# Patient Record
Sex: FEMALE | Race: WHITE | NOT HISPANIC OR LATINO | Employment: PART TIME | URBAN - METROPOLITAN AREA
[De-identification: names, ages, dates, MRNs, and addresses within clinical notes are randomized per-mention and may not be internally consistent; named-entity substitution may affect disease eponyms.]

---

## 2017-05-17 ENCOUNTER — TRANSCRIBE ORDERS (OUTPATIENT)
Dept: ADMINISTRATIVE | Facility: HOSPITAL | Age: 42
End: 2017-05-17

## 2017-05-17 DIAGNOSIS — Z12.31 VISIT FOR SCREENING MAMMOGRAM: Primary | ICD-10-CM

## 2017-09-18 ENCOUNTER — APPOINTMENT (OUTPATIENT)
Dept: MAMMOGRAPHY | Facility: HOSPITAL | Age: 42
End: 2017-09-18
Attending: OBSTETRICS & GYNECOLOGY

## 2017-09-21 ENCOUNTER — APPOINTMENT (OUTPATIENT)
Dept: MAMMOGRAPHY | Facility: HOSPITAL | Age: 42
End: 2017-09-21
Attending: OBSTETRICS & GYNECOLOGY

## 2017-09-26 ENCOUNTER — HOSPITAL ENCOUNTER (OUTPATIENT)
Dept: MAMMOGRAPHY | Facility: HOSPITAL | Age: 42
Discharge: HOME | End: 2017-09-26
Attending: OBSTETRICS & GYNECOLOGY | Admitting: OBSTETRICS & GYNECOLOGY

## 2017-09-26 DIAGNOSIS — Z12.31 VISIT FOR SCREENING MAMMOGRAM: ICD-10-CM

## 2017-09-26 PROCEDURE — G0202 SCR MAMMO BI INCL CAD: HCPCS

## 2017-09-26 PROCEDURE — 77063 BREAST TOMOSYNTHESIS BI: CPT

## 2017-09-28 PROCEDURE — 77063 BREAST TOMOSYNTHESIS BI: CPT | Performed by: RADIOLOGY

## 2017-09-28 PROCEDURE — 77067 SCR MAMMO BI INCL CAD: CPT | Performed by: RADIOLOGY

## 2018-09-18 ENCOUNTER — TRANSCRIBE ORDERS (OUTPATIENT)
Dept: MAMMOGRAPHY | Facility: HOSPITAL | Age: 43
End: 2018-09-18

## 2018-09-18 DIAGNOSIS — Z12.31 VISIT FOR SCREENING MAMMOGRAM: Primary | ICD-10-CM

## 2018-10-01 ENCOUNTER — TRANSCRIBE ORDERS (OUTPATIENT)
Dept: ADMINISTRATIVE | Facility: HOSPITAL | Age: 43
End: 2018-10-01

## 2018-10-01 DIAGNOSIS — Z12.31 VISIT FOR SCREENING MAMMOGRAM: Primary | ICD-10-CM

## 2018-10-05 ENCOUNTER — APPOINTMENT (OUTPATIENT)
Dept: MAMMOGRAPHY | Facility: HOSPITAL | Age: 43
End: 2018-10-05
Attending: OBSTETRICS & GYNECOLOGY

## 2018-10-26 ENCOUNTER — OFFICE VISIT (OUTPATIENT)
Dept: NEUROLOGY | Facility: CLINIC | Age: 43
End: 2018-10-26

## 2018-10-26 VITALS
SYSTOLIC BLOOD PRESSURE: 130 MMHG | HEIGHT: 65 IN | BODY MASS INDEX: 35.82 KG/M2 | DIASTOLIC BLOOD PRESSURE: 82 MMHG | WEIGHT: 215 LBS

## 2018-10-26 DIAGNOSIS — G43.019 INTRACTABLE MIGRAINE WITHOUT AURA AND WITHOUT STATUS MIGRAINOSUS: Primary | ICD-10-CM

## 2018-10-26 PROCEDURE — 99204 OFFICE O/P NEW MOD 45 MIN: CPT | Performed by: PSYCHIATRY & NEUROLOGY

## 2018-10-26 RX ORDER — LIFITEGRAST 50 MG/ML
SOLUTION/ DROPS OPHTHALMIC
COMMUNITY
Start: 2018-10-19 | End: 2020-01-20

## 2018-10-26 RX ORDER — PANTOPRAZOLE SODIUM 40 MG/1
TABLET, DELAYED RELEASE ORAL
COMMUNITY
Start: 2018-09-18 | End: 2022-08-03

## 2018-10-26 RX ORDER — RIZATRIPTAN BENZOATE 10 MG/1
10 TABLET ORAL ONCE AS NEEDED
Qty: 8 TABLET | Refills: 1 | Status: SHIPPED | OUTPATIENT
Start: 2018-10-26 | End: 2018-11-07 | Stop reason: SDUPTHER

## 2018-10-26 RX ORDER — NAPROXEN 500 MG/1
500 TABLET ORAL 2 TIMES DAILY WITH MEALS
COMMUNITY
End: 2020-01-20

## 2018-10-26 RX ORDER — NORETHINDRONE 0.35 MG/1
TABLET ORAL
COMMUNITY
Start: 2018-09-15 | End: 2021-01-18 | Stop reason: SDUPTHER

## 2018-10-26 RX ORDER — SUMATRIPTAN 100 MG/1
TABLET, FILM COATED ORAL
COMMUNITY
Start: 2018-09-18 | End: 2018-10-26

## 2018-10-30 ENCOUNTER — PRIOR AUTHORIZATION (OUTPATIENT)
Dept: NEUROLOGY | Facility: CLINIC | Age: 43
End: 2018-10-30

## 2018-11-07 RX ORDER — RIZATRIPTAN BENZOATE 10 MG/1
10 TABLET ORAL ONCE AS NEEDED
Qty: 8 TABLET | Refills: 1 | Status: SHIPPED | OUTPATIENT
Start: 2018-11-07 | End: 2018-11-27 | Stop reason: SDUPTHER

## 2018-11-16 ENCOUNTER — HOSPITAL ENCOUNTER (OUTPATIENT)
Dept: MAMMOGRAPHY | Facility: HOSPITAL | Age: 43
Discharge: HOME | End: 2018-11-16
Attending: OBSTETRICS & GYNECOLOGY | Admitting: OBSTETRICS & GYNECOLOGY

## 2018-11-16 DIAGNOSIS — Z12.31 VISIT FOR SCREENING MAMMOGRAM: ICD-10-CM

## 2018-11-16 PROCEDURE — 77067 SCR MAMMO BI INCL CAD: CPT | Performed by: RADIOLOGY

## 2018-11-16 PROCEDURE — 77063 BREAST TOMOSYNTHESIS BI: CPT | Performed by: RADIOLOGY

## 2018-11-16 PROCEDURE — 77067 SCR MAMMO BI INCL CAD: CPT

## 2018-11-16 PROCEDURE — 77063 BREAST TOMOSYNTHESIS BI: CPT

## 2018-11-27 RX ORDER — RIZATRIPTAN BENZOATE 10 MG/1
10 TABLET ORAL ONCE AS NEEDED
Qty: 8 TABLET | Refills: 2 | Status: SHIPPED | OUTPATIENT
Start: 2018-11-27 | End: 2019-02-14 | Stop reason: SDUPTHER

## 2019-02-04 ENCOUNTER — OFFICE VISIT (OUTPATIENT)
Dept: NEUROLOGY | Facility: CLINIC | Age: 44
End: 2019-02-04

## 2019-02-04 VITALS
HEIGHT: 65 IN | WEIGHT: 221.8 LBS | BODY MASS INDEX: 36.96 KG/M2 | SYSTOLIC BLOOD PRESSURE: 132 MMHG | DIASTOLIC BLOOD PRESSURE: 76 MMHG

## 2019-02-04 DIAGNOSIS — G43.019 INTRACTABLE MIGRAINE WITHOUT AURA AND WITHOUT STATUS MIGRAINOSUS: Primary | ICD-10-CM

## 2019-02-04 PROCEDURE — 99213 OFFICE O/P EST LOW 20 MIN: CPT | Performed by: PSYCHIATRY & NEUROLOGY

## 2019-02-04 NOTE — PROGRESS NOTES
"Subjective:    CC: Moira Cardoso is in clinic today for follow up for  migraines.        HPI:  She is in clinic for regular follow-up.  Since the last visit, she reports that she has taken total of 3 Aimovig -140 mg subcutaneous injections.  She reports that it has worked really well and the headache frequency and intensity has significantly reduced.  In month of December, she had only 1 bad headache.  In January, she hasn't had headaches.  She is tolerating medication well without any side effects.  Prior to starting Aimovig, she was getting approximately 7 headaches in a month.    The following portions of the patient's history were reviewed and updated as of 2019: allergies, social history and problem list.       Current Outpatient Medications:   •  JOHNNIE 0.35 MG tablet, , Disp: , Rfl:   •  Erenumab-aooe (AIMOVIG) 70 MG/ML prefilled syringe, Inject 2 mL under the skin into the appropriate area as directed Every 30 (Thirty) Days., Disp: 2 mL, Rfl: 12  •  naproxen (NAPROSYN) 500 MG tablet, Take 500 mg by mouth 2 (Two) Times a Day With Meals., Disp: , Rfl:   •  pantoprazole (PROTONIX) 40 MG EC tablet, , Disp: , Rfl:   •  XIIDRA 5 % solution, , Disp: , Rfl:    Past Medical History:   Diagnosis Date   • Migraine       Past Surgical History:   Procedure Laterality Date   •  SECTION     • DENTAL PROCEDURE        Family History   Problem Relation Age of Onset   • Mental illness Mother    • Alzheimer's disease Maternal Grandmother    • Parkinsonism Maternal Grandfather    • Breast cancer Neg Hx    • Ovarian cancer Neg Hx         Review of Systems   All other systems reviewed and are negative.    Objective:    /76   Ht 165.1 cm (65\")   Wt 101 kg (221 lb 12.8 oz)   BMI 36.91 kg/m²     Neurology Exam:  General apperance: NAD.     Mental status: Alert, awake and oriented to time place and person.    Recent and Remote memory: Can recall 3/3 objects at 5 minutes. Can recall historical events. "     Attention span and Concentration: Serial 7s: Normal.     Fund of knowledge:  Normal.     Language and Speech: No aphasia or dysarthria.    Naming , Repitition and Comprehension:  Can name objects, repeat a sentence and follow commands. Speech is clear and fluent with good repetition, comprehension, and naming.    CN II to XII: Intact.    Opthalmoscopic Exam: No papilledema.    Motor:  Right UE muscle strength 5/5. Normal tone.     Left UE muscle strength 5/5. Normal tone.      Right LE muscle strength5/5. Normal tone.     Left LE muscle strength 5/5. Normal tone.      Sensory: Normal light touch, vibration and pinprick sensation bilaterally.    DTRs: 2+ bilaterally.    Babinski: Negative bilaterally.    Co-ordination: Normal finger-to-nose, heel to shin B/L.    Rhomberg: Negative.    Gait: Normal.    Cardiovascular: Regular rate and rhythm without murmur, gallop or rub.    Assessment and Plan:  1. Intractable migraine without aura and without status migrainosus  Doing very well with the Aimovig 140 mg subcutaneous monthly injections.  She hasn't had headaches in month of November and January.  In December, she had only 1 headache.  Headaches have significantly reduced.  I would like her to continue with Aimovig monthly injections.  She has failed Topamax and propranolol in the past.       I spent 15 minutes face to face with the patient and spent 10 minutes of this time counseling and discussing about taking medication regularly, possible side effects with medication use, importance of good sleep hygiene, good hydration and regular exercise.    Return in about 3 months (around 5/4/2019).

## 2019-02-14 RX ORDER — RIZATRIPTAN BENZOATE 10 MG/1
TABLET ORAL
Qty: 8 TABLET | Refills: 2 | Status: SHIPPED | OUTPATIENT
Start: 2019-02-14 | End: 2020-03-03

## 2019-02-21 ENCOUNTER — TELEPHONE (OUTPATIENT)
Dept: NEUROLOGY | Facility: CLINIC | Age: 44
End: 2019-02-21

## 2019-02-21 NOTE — TELEPHONE ENCOUNTER
Patient was taking Amovig injections. Insurance now advising to use Ajovy or Emaglity. Please advise.

## 2019-02-24 ENCOUNTER — TELEPHONE (OUTPATIENT)
Dept: URGENT CARE | Facility: CLINIC | Age: 44
End: 2019-02-24

## 2019-02-24 DIAGNOSIS — Z20.828 EXPOSURE TO INFLUENZA: Primary | ICD-10-CM

## 2019-02-24 RX ORDER — OSELTAMIVIR PHOSPHATE 75 MG/1
75 CAPSULE ORAL DAILY
Qty: 10 CAPSULE | Refills: 0 | Status: SHIPPED | OUTPATIENT
Start: 2019-02-24 | End: 2020-01-20

## 2019-02-25 ENCOUNTER — TELEPHONE (OUTPATIENT)
Dept: NEUROLOGY | Facility: CLINIC | Age: 44
End: 2019-02-25

## 2019-02-25 NOTE — TELEPHONE ENCOUNTER
It was not approved for a year. I believe the medical assistant before me is whom filed the PA and it was approved for a trail run, which is like 3-4 months.

## 2019-02-25 NOTE — TELEPHONE ENCOUNTER
Informed know that Insurance does not cover Aimovig. So Derek has sent another migraine treatment medication over to the pharmacy called [facility]. When patient is due for next injection, she can stop by our office to have it done or she can go to [employer] and watch the tutorial. Also, informed pt of co-pay card that can be utilized.

## 2019-02-27 ENCOUNTER — TELEPHONE (OUTPATIENT)
Dept: NEUROLOGY | Facility: CLINIC | Age: 44
End: 2019-02-27

## 2019-03-06 ENCOUNTER — TELEPHONE (OUTPATIENT)
Dept: NEUROLOGY | Facility: CLINIC | Age: 44
End: 2019-03-06

## 2019-03-06 NOTE — TELEPHONE ENCOUNTER
----- Message from Renu Valdez sent at 3/6/2019 12:46 PM EST -----  Contact: PT  KATE:    PT CALLED SAYING SHE IS DUE FOR HER AIMOVIG SHOT TODAY AND HER  WHO USUALLY DOES IT FOR HER IS NOT AVAILABLE, SHE WAS WONDERING IF SHE COULD STOP BY THE OFFICE FOR SOMEONE TO DO THAT FOR HER.    CALLBACK : [phone]

## 2019-04-30 ENCOUNTER — PRIOR AUTHORIZATION (OUTPATIENT)
Dept: NEUROLOGY | Facility: CLINIC | Age: 44
End: 2019-04-30

## 2019-04-30 ENCOUNTER — TELEPHONE (OUTPATIENT)
Dept: NEUROLOGY | Facility: CLINIC | Age: 44
End: 2019-04-30

## 2019-04-30 NOTE — TELEPHONE ENCOUNTER
I filed PA today. Do not think it will be approved because insurance advises using Ajovy or Emagilty.    ----- Message from Renu Valdez sent at 4/30/2019  9:45 AM EDT -----  Contact: pt  KATE:    PT HAS BEEN USING THE DISCOUNT CARD TO GET HER AIMOVIG BECAUSE HER INSURANCE WILL NOT COVER IT. BUT THE DISCOUNT CARD SAYS THEY WILL NOT COVER ANYMORE, SHES BEEN USING IT FOR 6 MONTHS, 2 INJECTIONS A MONTH. SHE JUST WANTS TO KNOW WHAT CAN BE DONE NOW BECAUSE THIS RX WORKS REALLY WELL FOR HER AND SHE DOESN'T REALLY WANT TO STOP OR TRY SOMETHING ELSE.    CALLBACK : [phone]

## 2019-05-01 ENCOUNTER — TELEPHONE (OUTPATIENT)
Dept: NEUROLOGY | Facility: CLINIC | Age: 44
End: 2019-05-01

## 2019-05-01 NOTE — TELEPHONE ENCOUNTER
Aimovig was denied by insurance. Patient has not had a trial and fail with Ajovy or Emagilty. Patient has exhausted the co-pay card and not willing to try another preventative migraine medication since she has done so well on it.

## 2019-05-06 ENCOUNTER — OFFICE VISIT (OUTPATIENT)
Dept: NEUROLOGY | Facility: CLINIC | Age: 44
End: 2019-05-06

## 2019-05-06 VITALS
BODY MASS INDEX: 37.1 KG/M2 | RESPIRATION RATE: 16 BRPM | HEIGHT: 65 IN | SYSTOLIC BLOOD PRESSURE: 128 MMHG | DIASTOLIC BLOOD PRESSURE: 78 MMHG | WEIGHT: 222.66 LBS | OXYGEN SATURATION: 98 % | HEART RATE: 110 BPM

## 2019-05-06 DIAGNOSIS — G43.019 INTRACTABLE MIGRAINE WITHOUT AURA AND WITHOUT STATUS MIGRAINOSUS: Primary | ICD-10-CM

## 2019-05-06 PROCEDURE — 99213 OFFICE O/P EST LOW 20 MIN: CPT | Performed by: PSYCHIATRY & NEUROLOGY

## 2019-05-06 NOTE — PROGRESS NOTES
Subjective:    CC: Moira Cardoso is in clinic today for follow up for migraines.    HPI:  She is in clinic for regular follow-up.  Since her last visit, she reports that  was her last Aimovig injection.  After that insurance denied Aimovig and now she is on Ajovy.  She is due for her first Ajovy injection today.  So far, Aimovig was helping significantly reduce headache intensity and frequency    The following portions of the patient's history were reviewed and updated as of 2019: allergies, social history and problem list.       Current Outpatient Medications:   •  JOHNNIE 0.35 MG tablet, , Disp: , Rfl:   •  Fremanezumab-vfrm 225 MG/1.5ML solution prefilled syringe, Inject 225 mg under the skin into the appropriate area as directed Every 30 (Thirty) Days., Disp: 1 syringe, Rfl: 11  •  naproxen (NAPROSYN) 500 MG tablet, Take 500 mg by mouth 2 (Two) Times a Day With Meals., Disp: , Rfl:   •  pantoprazole (PROTONIX) 40 MG EC tablet, , Disp: , Rfl:   •  rizatriptan (MAXALT) 10 MG tablet, TAKE 1 TABLET ONE TIME AS  NEEDED FOR MIGRAINE, MAY   REPEAT IN 2 HOURS IF NEEDEDFOR UP TO 30 DAYS, Disp: 8 tablet, Rfl: 2  •  XIIDRA 5 % solution, , Disp: , Rfl:   •  oseltamivir (TAMIFLU) 75 MG capsule, Take 1 capsule by mouth Daily., Disp: 10 capsule, Rfl: 0   Past Medical History:   Diagnosis Date   • Migraine       Past Surgical History:   Procedure Laterality Date   •  SECTION     • DENTAL PROCEDURE        Family History   Problem Relation Age of Onset   • Mental illness Mother    • Alzheimer's disease Maternal Grandmother    • Parkinsonism Maternal Grandfather    • Breast cancer Neg Hx    • Ovarian cancer Neg Hx         Review of Systems   Constitutional: Negative.    HENT: Negative.    Eyes: Negative.    Respiratory: Negative.    Cardiovascular: Negative.    Gastrointestinal: Negative.    Endocrine: Negative.    Genitourinary: Negative.    Musculoskeletal: Negative.    Skin: Negative.   "  Allergic/Immunologic: Negative.    Neurological: Negative.    Hematological: Negative.    Psychiatric/Behavioral: Negative.      Objective:    /78   Pulse 110   Resp 16   Ht 165.1 cm (65\")   Wt 101 kg (222 lb 10.6 oz)   SpO2 98%   BMI 37.05 kg/m²     Neurology Exam:  General apperance: NAD.     Mental status: Alert, awake and oriented to time place and person.    Recent and Remote memory: Can recall 3/3 objects at 5 minutes. Can recall historical events.     Attention span and Concentration: Serial 7s: Normal.     Fund of knowledge:  Normal.     Language and Speech: No aphasia or dysarthria.    Naming , Repitition and Comprehension:  Can name objects, repeat a sentence and follow commands. Speech is clear and fluent with good repetition, comprehension, and naming.    CN II to XII: Intact.    Opthalmoscopic Exam: No papilledema.    Motor:  Right UE muscle strength 5/5. Normal tone.     Left UE muscle strength 5/5. Normal tone.      Right LE muscle strength5/5. Normal tone.     Left LE muscle strength 5/5. Normal tone.      Sensory: Normal light touch, vibration and pinprick sensation bilaterally.    DTRs: 2+ bilaterally.    Babinski: Negative bilaterally.    Co-ordination: Normal finger-to-nose, heel to shin B/L.    Rhomberg: Negative.    Gait: Normal.    Cardiovascular: Regular rate and rhythm without murmur, gallop or rub.    Assessment and Plan:  1. Intractable migraine without aura and without status migrainosus  She did Aimovig injections for 1 year and her last injection was on April 6.  Following this, insurance has denied Aimovig.  Today first Ajovy injection was done in clinic.  Since she did very well with Aimovig, I am expecting same results with Ajovy.  Her next Ajovy injection will be on June 6.  I have advised her to call office if headaches become more frequent or intense otherwise, continue with Ajovy monthly injections and I will see her back in 3 months for follow-up.       I spent 15 " minutes face to face with the patient and spent 10 minutes of this time  in management, instructions and education regarding above mentioned diagnosis and also on counseling and discussing about taking medication regularly, possible side effects with medication use, importance of good sleep hygiene, good hydration and regular exercise.    Return in about 3 months (around 8/6/2019).

## 2019-05-22 ENCOUNTER — TELEPHONE (OUTPATIENT)
Dept: NEUROLOGY | Facility: CLINIC | Age: 44
End: 2019-05-22

## 2019-05-22 NOTE — TELEPHONE ENCOUNTER
----- Message from Zeina Armas, [employer] Rep sent at 5/22/2019 11:18 AM EDT -----  Derek    PT called, stated her insurance was giving her a bit of trouble again. Says they're requesting another prior auth for her Ajovy injections. PT is still using the same pharmacy, (at [facility] PT would like a prior authorization to be completed for the medication and she'd like a notification once it's been completed.     Please call Moira back:  [phone]

## 2019-05-22 NOTE — TELEPHONE ENCOUNTER
Spoke with pharmacy help line and got pt Ajovy approved for 3 months. They will be faxing over a approval letter. Left voicemail informing pt of information.

## 2019-09-04 ENCOUNTER — PRIOR AUTHORIZATION (OUTPATIENT)
Dept: NEUROLOGY | Facility: CLINIC | Age: 44
End: 2019-09-04

## 2019-09-04 ENCOUNTER — TELEPHONE (OUTPATIENT)
Dept: NEUROLOGY | Facility: CLINIC | Age: 44
End: 2019-09-04

## 2019-09-04 NOTE — TELEPHONE ENCOUNTER
----- Message from Karis Gaytan sent at 9/4/2019 10:32 AM EDT -----  Contact: Pt   Derek     Pt called in regards to prior autho for RX Fremanezumab-vfrm 225 MG/1.5ML . Pt wants to know if its been completed yet. Please call.

## 2019-09-19 ENCOUNTER — OFFICE VISIT (OUTPATIENT)
Dept: NEUROLOGY | Facility: CLINIC | Age: 44
End: 2019-09-19

## 2019-09-19 VITALS
HEIGHT: 65 IN | DIASTOLIC BLOOD PRESSURE: 78 MMHG | SYSTOLIC BLOOD PRESSURE: 124 MMHG | BODY MASS INDEX: 38.49 KG/M2 | WEIGHT: 231 LBS

## 2019-09-19 DIAGNOSIS — G43.019 INTRACTABLE MIGRAINE WITHOUT AURA AND WITHOUT STATUS MIGRAINOSUS: Primary | ICD-10-CM

## 2019-09-19 PROCEDURE — 99214 OFFICE O/P EST MOD 30 MIN: CPT | Performed by: PSYCHIATRY & NEUROLOGY

## 2019-09-19 NOTE — PROGRESS NOTES
Subjective:    CC: Moira Cardoso is in clinic today for follow up for migraines.    HPI:  She is in clinic for regular follow-up.  Since her last visit, she has been taking Ajovy every month.  She is now done about 5 Ajovy injections.  She reports that the effect with Ajovy is as not as good as what it was with Aimovig.  With Aimovig, she had complete resolution of headaches but with Ajovy, she is getting 3-4 breakthrough headaches in a month.  Still better than more than 15 headaches that she was experiencing prior to starting CGRP blockers.  She takes rizatriptan or ibuprofen as needed as an abortive therapy and it works really well.  She denies any side effects with Ajovy use.  She used Aimovig for 6 months prior to Ajovy and then insurance denied it.    The following portions of the patient's history were reviewed and updated as of 2019: allergies, social history and problem list.       Current Outpatient Medications:   •  JOHNNIE 0.35 MG tablet, , Disp: , Rfl:   •  Fremanezumab-vfrm 225 MG/1.5ML solution prefilled syringe, Inject 225 mg under the skin into the appropriate area as directed Every 30 (Thirty) Days., Disp: 1 syringe, Rfl: 11  •  naproxen (NAPROSYN) 500 MG tablet, Take 500 mg by mouth 2 (Two) Times a Day With Meals., Disp: , Rfl:   •  oseltamivir (TAMIFLU) 75 MG capsule, Take 1 capsule by mouth Daily., Disp: 10 capsule, Rfl: 0  •  pantoprazole (PROTONIX) 40 MG EC tablet, , Disp: , Rfl:   •  rizatriptan (MAXALT) 10 MG tablet, TAKE 1 TABLET ONE TIME AS  NEEDED FOR MIGRAINE, MAY   REPEAT IN 2 HOURS IF NEEDEDFOR UP TO 30 DAYS, Disp: 8 tablet, Rfl: 2  •  XIIDRA 5 % solution, , Disp: , Rfl:    Past Medical History:   Diagnosis Date   • Migraine       Past Surgical History:   Procedure Laterality Date   •  SECTION     • DENTAL PROCEDURE        Family History   Problem Relation Age of Onset   • Mental illness Mother    • Alzheimer's disease Maternal Grandmother    • Parkinsonism Maternal  "Grandfather    • Breast cancer Neg Hx    • Ovarian cancer Neg Hx         Review of Systems   Neurological: Positive for headache.     Objective:    /78   Ht 165.1 cm (65\")   Wt 105 kg (231 lb)   BMI 38.44 kg/m²     Neurology Exam:  General apperance: NAD.     Mental status: Alert, awake and oriented to time place and person.    Recent and Remote memory: Can recall 3/3 objects at 5 minutes. Can recall historical events.     Attention span and Concentration: Serial 7s: Normal.     Fund of knowledge:  Normal.     Language and Speech: No aphasia or dysarthria.    Naming , Repitition and Comprehension:  Can name objects, repeat a sentence and follow commands. Speech is clear and fluent with good repetition, comprehension, and naming.    CN II to XII: Intact.    Opthalmoscopic Exam: No papilledema.    Motor:  Right UE muscle strength 5/5. Normal tone.     Left UE muscle strength 5/5. Normal tone.      Right LE muscle strength5/5. Normal tone.     Left LE muscle strength 5/5. Normal tone.      Sensory: Normal light touch, vibration and pinprick sensation bilaterally.    DTRs: 2+ bilaterally.    Babinski: Negative bilaterally.    Co-ordination: Normal finger-to-nose, heel to shin B/L.    Rhomberg: Negative.    Gait: Normal.    Cardiovascular: Regular rate and rhythm without murmur, gallop or rub.    Assessment and Plan:  1. Intractable migraine without aura and without status migrainosus  She has now been on Ajovy for last 5 months.  Headache intensity and frequency has reduced and currently she is expensing 3-4 breakthrough migraines.  She overall did much better on Aimovig but still happy with Ajovy as it does help reduce headache intensity and frequency significantly.  Continue with Ajovy for now.  Continue with Maxalt as needed as an abortive therapy.  I will see her back in 6 months for follow-up.       I spent 15 minutes face to face with the patient and spent more than 50% of this time  in management, " instructions and education regarding above mentioned diagnosis and also on counseling and discussing about taking medication regularly, possible side effects with medication use, importance of good sleep hygiene, good hydration and regular exercise.    Return in about 6 months (around 3/19/2020).

## 2019-10-21 ENCOUNTER — TRANSCRIBE ORDERS (OUTPATIENT)
Dept: ADMINISTRATIVE | Facility: HOSPITAL | Age: 44
End: 2019-10-21

## 2019-10-21 DIAGNOSIS — Z12.31 VISIT FOR SCREENING MAMMOGRAM: Primary | ICD-10-CM

## 2020-01-17 ENCOUNTER — HOSPITAL ENCOUNTER (OUTPATIENT)
Dept: MAMMOGRAPHY | Facility: HOSPITAL | Age: 45
Discharge: HOME | End: 2020-01-17
Admitting: OBSTETRICS & GYNECOLOGY

## 2020-01-17 DIAGNOSIS — Z12.31 VISIT FOR SCREENING MAMMOGRAM: ICD-10-CM

## 2020-01-17 PROCEDURE — 77063 BREAST TOMOSYNTHESIS BI: CPT | Performed by: RADIOLOGY

## 2020-01-17 PROCEDURE — 77067 SCR MAMMO BI INCL CAD: CPT

## 2020-01-17 PROCEDURE — 77063 BREAST TOMOSYNTHESIS BI: CPT

## 2020-01-17 PROCEDURE — 77067 SCR MAMMO BI INCL CAD: CPT | Performed by: RADIOLOGY

## 2020-01-20 ENCOUNTER — OFFICE VISIT (OUTPATIENT)
Dept: NEUROSURGERY | Facility: CLINIC | Age: 45
End: 2020-01-20

## 2020-01-20 VITALS
WEIGHT: 230 LBS | DIASTOLIC BLOOD PRESSURE: 82 MMHG | SYSTOLIC BLOOD PRESSURE: 120 MMHG | HEIGHT: 65 IN | BODY MASS INDEX: 38.32 KG/M2

## 2020-01-20 DIAGNOSIS — M54.50 CHRONIC BILATERAL LOW BACK PAIN WITHOUT SCIATICA: Primary | ICD-10-CM

## 2020-01-20 DIAGNOSIS — G89.29 CHRONIC BILATERAL LOW BACK PAIN WITHOUT SCIATICA: Primary | ICD-10-CM

## 2020-01-20 PROBLEM — E66.812 CLASS 2 OBESITY DUE TO EXCESS CALORIES WITHOUT SERIOUS COMORBIDITY WITH BODY MASS INDEX (BMI) OF 38.0 TO 38.9 IN ADULT: Status: ACTIVE | Noted: 2020-01-20

## 2020-01-20 PROBLEM — E66.09 CLASS 2 OBESITY DUE TO EXCESS CALORIES WITHOUT SERIOUS COMORBIDITY WITH BODY MASS INDEX (BMI) OF 38.0 TO 38.9 IN ADULT: Status: ACTIVE | Noted: 2020-01-20

## 2020-01-20 PROCEDURE — 99203 OFFICE O/P NEW LOW 30 MIN: CPT | Performed by: NEUROLOGICAL SURGERY

## 2020-01-20 NOTE — PROGRESS NOTES
Subjective     Chief Complaint: Low back pain    Patient ID: Moira Cardoso is a 44 y.o. female seen for consultation today at the request of  Brenda Holm MD    Back Pain   This is a chronic problem. The current episode started more than 1 month ago. The problem occurs intermittently. The problem has been waxing and waning since onset. The pain is present in the lumbar spine. The quality of the pain is described as aching. The pain does not radiate. The pain is moderate. The pain is worse during the day. The symptoms are aggravated by bending and sitting. Stiffness is present in the morning. Associated symptoms include headaches. Pertinent negatives include no abdominal pain, chest pain, dysuria, fever, numbness, pelvic pain or weakness. Risk factors include lack of exercise and obesity. She has tried analgesics, heat and NSAIDs for the symptoms. The treatment provided moderate relief.       This is a 44-year-old woman who presents to my office with an approximately 1 year history of left-sided low back pain.  She denies any sciatica type symptoms.  She does have plantar fasciitis on her right foot and wonders if the fact that she has been limping on this foot for quite some time has contributed to the worsening of her low back pain.  She has a primarily sedentary desk job and she reports that sitting for long periods of time exacerbates her low back pain.  She recently went to [employer] and reports that walking around seemed to help her back pain quite a bit.  She also reports that bending over to take care of children also exacerbates her back pain.  She denies any bladder/bowel incontinence.  She is overweight, but does not have any significant comorbid conditions other than some migraines.  She denies alcohol or tobacco abuse.    The following portions of the patient's history were reviewed and updated as appropriate: allergies, current medications, past family history, past medical history, past social  history, past surgical history and problem list.    Family history:   Family History   Problem Relation Age of Onset   • Mental illness Mother    • Alzheimer's disease Maternal Grandmother    • Parkinsonism Maternal Grandfather    • Breast cancer Neg Hx    • Ovarian cancer Neg Hx        Social history:   Social History     Socioeconomic History   • Marital status:      Spouse name: Not on file   • Number of children: Not on file   • Years of education: Not on file   • Highest education level: Not on file   Tobacco Use   • Smoking status: Never Smoker   • Smokeless tobacco: Never Used   Substance and Sexual Activity   • Alcohol use: Yes     Comment: occasionally   • Drug use: No   • Sexual activity: Defer   Social History Narrative           Review of Systems   Constitutional: Negative for activity change, appetite change, chills, diaphoresis, fatigue, fever and unexpected weight change.   HENT: Negative for congestion, dental problem, drooling, ear discharge, ear pain, facial swelling, hearing loss, mouth sores, nosebleeds, postnasal drip, rhinorrhea, sinus pressure, sinus pain, sneezing, sore throat, tinnitus, trouble swallowing and voice change.    Eyes: Negative for photophobia, pain, discharge, redness, itching and visual disturbance.   Respiratory: Negative for apnea, cough, choking, chest tightness, shortness of breath, wheezing and stridor.    Cardiovascular: Negative for chest pain, palpitations and leg swelling.   Gastrointestinal: Negative for abdominal distention, abdominal pain, anal bleeding, blood in stool, constipation, diarrhea, nausea, rectal pain and vomiting.   Endocrine: Negative for cold intolerance, heat intolerance, polydipsia, polyphagia and polyuria.   Genitourinary: Negative for decreased urine volume, difficulty urinating, dyspareunia, dysuria, enuresis, flank pain, frequency, genital sores, hematuria, menstrual problem, pelvic pain, urgency, vaginal bleeding, vaginal  "discharge and vaginal pain.   Musculoskeletal: Positive for back pain. Negative for arthralgias, gait problem, joint swelling, myalgias, neck pain and neck stiffness.   Skin: Negative for color change, pallor, rash and wound.   Allergic/Immunologic: Negative for environmental allergies, food allergies and immunocompromised state.   Neurological: Positive for headaches. Negative for dizziness, tremors, seizures, syncope, facial asymmetry, speech difficulty, weakness, light-headedness and numbness.   Hematological: Negative for adenopathy. Does not bruise/bleed easily.   Psychiatric/Behavioral: Negative for agitation, behavioral problems, confusion, decreased concentration, dysphoric mood, hallucinations, self-injury, sleep disturbance and suicidal ideas. The patient is not nervous/anxious and is not hyperactive.        Objective   Blood pressure 120/82, height 165.1 cm (65\"), weight 104 kg (230 lb), last menstrual period 01/03/2020.  Body mass index is 38.27 kg/m².    Physical Exam   Constitutional: She is oriented to person, place, and time. She appears well-developed.  Non-toxic appearance.   HENT:   Head: Normocephalic and atraumatic.   Right Ear: Hearing normal.   Left Ear: Hearing normal.   Nose: Nose normal.   Eyes: Pupils are equal, round, and reactive to light. Conjunctivae, EOM and lids are normal.   Neck: Normal range of motion. No JVD present.   Cardiovascular: Normal rate and regular rhythm.   Pulses:       Radial pulses are 2+ on the right side, and 2+ on the left side.   Pulmonary/Chest: Effort normal. No stridor. No respiratory distress. She has no wheezes.   Neurological: She is alert and oriented to person, place, and time. She has normal reflexes. She displays normal reflexes. No cranial nerve deficit. She exhibits normal muscle tone. She displays a negative Romberg sign. Coordination and gait normal. GCS eye subscore is 4. GCS verbal subscore is 5. GCS motor subscore is 6.   Reflex Scores:       " Patellar reflexes are 2+ on the right side and 2+ on the left side.       Achilles reflexes are 2+ on the right side and 2+ on the left side.  Skin: Skin is warm and dry. No rash noted. No erythema.   Psychiatric: She has a normal mood and affect. Her behavior is normal. Judgment and thought content normal.   Nursing note and vitals reviewed.        Assessment/Plan     Independent Review of Radiographic Studies:      Available for my review is a MRI of the lumbar spine which was performed on 12/16/2019.  This demonstrates an annular tear at L5-S1 with some early Modic endplate changes at this level.  There is additionally some degenerative disc disease with a broad-based disc bulge at L1-2.  The spectral spinal canal is capacious.  There are no abnormal foci of significant lateral recess or neuroforaminal stenosis.  The small disc bulge at L5-S1 does not significantly encroach upon the lateral recesses with the descending S1 nerve roots.  Lumbar lordosis is somewhat decreased.    Medical Decision Making:      This is a 44-year-old woman with chronic, intermittent left-sided low back pain without sciatica.  She is an excellent candidate for physical therapy.  I reviewed the signs and symptoms of lumbosacral radiculopathy and cauda equina with her.  I directed her to contact my office with new or worsening symptoms.  Otherwise, I would be happy to follow-up with her on an as-needed basis moving forward.    Moira was seen today for back pain.    Diagnoses and all orders for this visit:    Chronic bilateral low back pain without sciatica  -     Ambulatory Referral to Physical Therapy        Return if symptoms worsen or fail to improve.           This document signed by RAUDEL Simmons MD January 20, 2020 2:03 PM

## 2020-03-03 RX ORDER — RIZATRIPTAN BENZOATE 10 MG/1
TABLET ORAL
Qty: 8 TABLET | Refills: 2 | Status: SHIPPED | OUTPATIENT
Start: 2020-03-03 | End: 2021-01-21 | Stop reason: SDUPTHER

## 2020-09-09 ENCOUNTER — OFFICE VISIT (OUTPATIENT)
Dept: NEUROLOGY | Facility: CLINIC | Age: 45
End: 2020-09-09

## 2020-09-09 ENCOUNTER — TELEPHONE (OUTPATIENT)
Dept: NEUROLOGY | Facility: CLINIC | Age: 45
End: 2020-09-09

## 2020-09-09 VITALS
OXYGEN SATURATION: 99 % | WEIGHT: 235.8 LBS | BODY MASS INDEX: 39.29 KG/M2 | DIASTOLIC BLOOD PRESSURE: 70 MMHG | SYSTOLIC BLOOD PRESSURE: 112 MMHG | HEIGHT: 65 IN | TEMPERATURE: 98.2 F | HEART RATE: 91 BPM

## 2020-09-09 DIAGNOSIS — G43.019 INTRACTABLE MIGRAINE WITHOUT AURA AND WITHOUT STATUS MIGRAINOSUS: Primary | ICD-10-CM

## 2020-09-09 PROCEDURE — 99214 OFFICE O/P EST MOD 30 MIN: CPT | Performed by: PSYCHIATRY & NEUROLOGY

## 2020-09-09 NOTE — TELEPHONE ENCOUNTER
FABIÁN FROM [facility] CALLED RE: PT'S LOADING DOSE OF EMGALITY, STATED QUANTITY DOES NOT MATCH NORMAL FOR INITIAL LOADING DOSE, AS IT IS TYPICALLY DOUBLE THE MAINTENANCE DOSE.  PLEASE CALL TO CLARIFY IF THIS IS THE CORRECT QUANTITY.    PLEASE CALL BACK: [phone]

## 2020-09-09 NOTE — PROGRESS NOTES
Subjective:    CC: Moira Cardoso is in clinic today for follow up for intractable migraines.    HPI:    9/19/2019: She is in clinic for regular follow-up.  Since her last visit, she has been taking Ajovy every month.  She is now done about 5 Ajovy injections.  She reports that the effect with Ajovy is as not as good as what it was with Aimovig.  With Aimovig, she had complete resolution of headaches but with Ajovy, she is getting 3-4 breakthrough headaches in a month.  Still better than more than 15 headaches that she was experiencing prior to starting CGRP blockers.  She takes rizatriptan or ibuprofen as needed as an abortive therapy and it works really well.  She denies any side effects with Ajovy use.  She used Aimovig for 6 months prior to Ajovy and then insurance denied it.    9/9/2020: She is in clinic for regular follow-up.  Since her last visit 1 year ago, she reports that in the last 3 to 4 months, migraine intensity and frequency is slightly worse.  She is now experiencing 5-6 breakthrough migraines increase from 2-3 breakthrough migraines.  She is on Ajovy monthly injections and has done it for 1 year now.  Prior to Ajovy, she was on Aimovig and it had helped her significantly in controlling migraines but unfortunately insurance denied it.  She currently takes Maxalt as needed as an abortive treatment and does report side effects with Maxalt use.    The following portions of the patient's history were reviewed and updated as of 09/09/2020: allergies, social history and problem list.       Current Outpatient Medications:   •  JOHNNIE 0.35 MG tablet, , Disp: , Rfl:   •  pantoprazole (PROTONIX) 40 MG EC tablet, , Disp: , Rfl:   •  galcanezumab-gnlm (EMGALITY) 120 MG/ML prefilled syringe, Inject 2 mL under the skin into the appropriate area as directed 1 (One) Time for 1 dose., Disp: 1.12 mL, Rfl: 0  •  galcanezumab-gnlm (EMGALITY) 120 MG/ML prefilled syringe, Inject 1 mL under the skin into the appropriate  "area as directed Every 30 (Thirty) Days., Disp: 1.12 mL, Rfl: 11  •  rizatriptan (MAXALT) 10 MG tablet, TAKE 1 TABLET ONE TIME AS  NEEDED FOR MIGRAINE, MAY   REPEAT IN 2 HOURS IF NEEDEDFOR UP TO 30 DAYS, Disp: 8 tablet, Rfl: 2  •  silver sulfadiazine (SILVADENE, SSD) 1 % cream, Apply  topically to the appropriate area as directed 2 (Two) Times a Day., Disp: 50 g, Rfl: 0  •  ubrogepant (ubrogepant) 100 MG tablet, Take 1 tablet by mouth As Needed (Migraine) for up to 30 days., Disp: 10 tablet, Rfl: 3   Past Medical History:   Diagnosis Date   • Migraine       Past Surgical History:   Procedure Laterality Date   •  SECTION     • DENTAL PROCEDURE        Family History   Problem Relation Age of Onset   • Mental illness Mother    • Alzheimer's disease Maternal Grandmother    • Parkinsonism Maternal Grandfather    • Breast cancer Neg Hx    • Ovarian cancer Neg Hx         Review of Systems   Constitutional: Negative.    HENT: Negative.    Eyes: Negative.    Respiratory: Negative.    Cardiovascular: Negative.    Gastrointestinal: Negative.    Endocrine: Negative.    Genitourinary: Negative.    Musculoskeletal: Negative.    Skin: Negative.    Allergic/Immunologic: Negative.    Neurological: Positive for light-headedness and headache.   Hematological: Negative.    Psychiatric/Behavioral: Negative.      Objective:    /70   Pulse 91   Temp 98.2 °F (36.8 °C)   Ht 165.1 cm (65\")   Wt 107 kg (235 lb 12.8 oz)   SpO2 99%   BMI 39.24 kg/m²     Neurology Exam:  General apperance: NAD.     Mental status: Alert, awake and oriented to time place and person.    Recent and Remote memory: Can recall 3/3 objects at 5 minutes. Can recall historical events.     Attention span and Concentration: Serial 7s: Normal.     Fund of knowledge:  Normal.     Language and Speech: No aphasia or dysarthria.    Naming , Repitition and Comprehension:  Can name objects, repeat a sentence and follow commands. Speech is clear and fluent with " good repetition, comprehension, and naming.    CN II to XII: Intact.    Opthalmoscopic Exam: No papilledema.    Motor:  Right UE muscle strength 5/5. Normal tone.     Left UE muscle strength 5/5. Normal tone.      Right LE muscle strength5/5. Normal tone.     Left LE muscle strength 5/5. Normal tone.      Sensory: Normal light touch, vibration and pinprick sensation bilaterally.    DTRs: 2+ bilaterally.    Babinski: Negative bilaterally.    Co-ordination: Normal finger-to-nose, heel to shin B/L.    Rhomberg: Negative.    Gait: Normal.    Cardiovascular: Regular rate and rhythm without murmur, gallop or rub.    Assessment and Plan:  1. Intractable migraine without aura and without status migrainosus  Since she is reporting worsening in migraine intensity and frequency with Ajovy use, will stop Ajovy and instead start Emgality monthly injection for migraine prevention.  She is also reporting side effects with Maxalt use so will prescribe her Ubrelvy 100 mg to be taken as needed as an abortive treatment and I will plan to see her back in 8 to 10 weeks for follow-up.       I spent 25 minutes face to face with the patient and spent more than 50% of this time  in management, instructions and education regarding above mentioned diagnosis and also on counseling and discussing about taking medication regularly, possible side effects with medication use, importance of good sleep hygiene, good hydration and regular exercise.    Return in about 10 weeks (around 11/18/2020).

## 2020-09-09 NOTE — TELEPHONE ENCOUNTER
S/W Lanre in pharmacy in [facility]. Advised to double the initial dosage of Emgality.     Called pt to inform her of medication schedule. She verbalized understanding

## 2020-09-24 ENCOUNTER — TELEPHONE (OUTPATIENT)
Dept: NEUROLOGY | Facility: CLINIC | Age: 45
End: 2020-09-24

## 2020-10-23 ENCOUNTER — TELEPHONE (OUTPATIENT)
Dept: NEUROLOGY | Facility: CLINIC | Age: 45
End: 2020-10-23

## 2020-10-26 NOTE — TELEPHONE ENCOUNTER
PT CALLED IN STATING SHE NEEDS A REFILL ON THE EMGALITY. UNDER HER MED LIST, REFILLS SHOWS 11 BUT CHARLETTEMITCHEL STATES SHE HAS NO REFILLS LEFT. PT STATES SHE NEEDS MEDICATION BY Thursday AS SHE TAKES THE SHOT THE SAME DAY EVERY MONTH.      CALL BACK- [phone]

## 2020-10-27 RX ORDER — GALCANEZUMAB 120 MG/ML
INJECTION, SOLUTION SUBCUTANEOUS
Qty: 2 EACH | Refills: 11 | Status: SHIPPED | OUTPATIENT
Start: 2020-10-27 | End: 2021-01-13

## 2020-11-30 ENCOUNTER — TELEPHONE (OUTPATIENT)
Dept: NEUROLOGY | Facility: CLINIC | Age: 45
End: 2020-11-30

## 2020-12-14 ENCOUNTER — TRANSCRIBE ORDERS (OUTPATIENT)
Dept: ADMINISTRATIVE | Facility: HOSPITAL | Age: 45
End: 2020-12-14

## 2020-12-14 DIAGNOSIS — Z12.31 VISIT FOR SCREENING MAMMOGRAM: Primary | ICD-10-CM

## 2021-01-13 ENCOUNTER — OFFICE VISIT (OUTPATIENT)
Dept: NEUROLOGY | Facility: CLINIC | Age: 46
End: 2021-01-13

## 2021-01-13 VITALS
TEMPERATURE: 97.5 F | DIASTOLIC BLOOD PRESSURE: 84 MMHG | BODY MASS INDEX: 36.99 KG/M2 | WEIGHT: 222 LBS | OXYGEN SATURATION: 99 % | SYSTOLIC BLOOD PRESSURE: 110 MMHG | HEIGHT: 65 IN | HEART RATE: 95 BPM

## 2021-01-13 DIAGNOSIS — G43.019 INTRACTABLE MIGRAINE WITHOUT AURA AND WITHOUT STATUS MIGRAINOSUS: Primary | ICD-10-CM

## 2021-01-13 PROCEDURE — 99214 OFFICE O/P EST MOD 30 MIN: CPT | Performed by: PSYCHIATRY & NEUROLOGY

## 2021-01-13 RX ORDER — ERENUMAB-AOOE 140 MG/ML
140 INJECTION, SOLUTION SUBCUTANEOUS
Qty: 1.12 ML | Refills: 11 | Status: SHIPPED | OUTPATIENT
Start: 2021-01-13 | End: 2021-11-09 | Stop reason: SDUPTHER

## 2021-01-13 NOTE — PROGRESS NOTES
Subjective:    CC: Moira Cardoso is in clinic today for follow up for      HPI:  9/19/2019: She is in clinic for regular follow-up.  Since her last visit, she has been taking Ajovy every month.  She is now done about 5 Ajovy injections.  She reports that the effect with Ajovy is as not as good as what it was with Aimovig.  With Aimovig, she had complete resolution of headaches but with Ajovy, she is getting 3-4 breakthrough headaches in a month.  Still better than more than 15 headaches that she was experiencing prior to starting CGRP blockers.  She takes rizatriptan or ibuprofen as needed as an abortive therapy and it works really well.  She denies any side effects with Ajovy use.  She used Aimovig for 6 months prior to Ajovy and then insurance denied it.    9/9/2020: She is in clinic for regular follow-up.  Since her last visit 1 year ago, she reports that in the last 3 to 4 months, migraine intensity and frequency is slightly worse.  She is now experiencing 5-6 breakthrough migraines increase from 2-3 breakthrough migraines.  She is on Ajovy monthly injections and has done it for 1 year now.  Prior to Ajovy, she was on Aimovig and it had helped her significantly in controlling migraines but unfortunately insurance denied it.  She currently takes Maxalt as needed as an abortive treatment and does report side effects with Maxalt use.    1/13/2021: She is in clinic for regular follow-up.  Since her last visit in September, insurance did not approve Emgality.  She did try Ajovy prior to trying Emgality for about 6 months but it did not really help.  She had an excellent response with Aimovig monthly injection.  In the past, she has tried and failed Topamax and propranolol.      The following portions of the patient's history were reviewed and updated as of 01/13/2021: allergies, social history and problem list.       Current Outpatient Medications:   •  JOHNNIE 0.35 MG tablet, , Disp: , Rfl:   •  pantoprazole  "(PROTONIX) 40 MG EC tablet, , Disp: , Rfl:   •  rizatriptan (MAXALT) 10 MG tablet, TAKE 1 TABLET ONE TIME AS  NEEDED FOR MIGRAINE, MAY   REPEAT IN 2 HOURS IF NEEDEDFOR UP TO 30 DAYS, Disp: 8 tablet, Rfl: 2  •  Erenumab-aooe (Aimovig) 140 MG/ML prefilled syringe, Inject 1 mL under the skin into the appropriate area as directed Every 30 (Thirty) Days., Disp: 1.12 mL, Rfl: 11   Past Medical History:   Diagnosis Date   • Migraine       Past Surgical History:   Procedure Laterality Date   •  SECTION     • DENTAL PROCEDURE        Family History   Problem Relation Age of Onset   • Mental illness Mother    • Alzheimer's disease Maternal Grandmother    • Parkinsonism Maternal Grandfather    • Breast cancer Neg Hx    • Ovarian cancer Neg Hx         Review of Systems   Constitutional: Negative.    HENT: Negative.    Eyes: Negative.    Respiratory: Negative.    Cardiovascular: Negative.    Gastrointestinal: Negative.    Endocrine: Negative.    Genitourinary: Negative.    Musculoskeletal: Negative.    Skin: Negative.    Allergic/Immunologic: Negative.    Neurological: Positive for headache.   Hematological: Negative.    Psychiatric/Behavioral: Negative.      Objective:    /84   Pulse 95   Temp 97.5 °F (36.4 °C)   Ht 165.1 cm (65\")   Wt 101 kg (222 lb)   SpO2 99%   BMI 36.94 kg/m²     Neurology Exam:  General apperance: NAD.     Mental status: Alert, awake and oriented to time place and person.    Recent and Remote memory: Can recall 3/3 objects at 5 minutes. Can recall historical events.     Attention span and Concentration: Serial 7s: Normal.     Fund of knowledge:  Normal.     Language and Speech: No aphasia or dysarthria.    Naming , Repitition and Comprehension:  Can name objects, repeat a sentence and follow commands. Speech is clear and fluent with good repetition, comprehension, and naming.    CN II to XII: Intact.    Opthalmoscopic Exam: No papilledema.    Motor:  Right UE muscle strength 5/5. Normal " tone.     Left UE muscle strength 5/5. Normal tone.      Right LE muscle strength5/5. Normal tone.     Left LE muscle strength 5/5. Normal tone.      Sensory: Normal light touch, vibration and pinprick sensation bilaterally.    DTRs: 2+ bilaterally.    Babinski: Negative bilaterally.    Co-ordination: Normal finger-to-nose, heel to shin B/L.    Rhomberg: Negative.    Gait: Normal.    Cardiovascular: Regular rate and rhythm without murmur, gallop or rub.    Assessment and Plan:  1. Intractable migraine without aura and without status migrainosus  So far she has tried all 3 CGRP blockers but Aimovig has been the most effective.  With Aimovig, her migraine resolved almost completely.  Last time she tried Aimovig was back in year 2018.  I am going to prescribe her Aimovig again and hopefully insurance will approve it.  Maxalt 10 mg as needed is working well as an abortive treatment.  I will plan to see her back in clinic in 3 months for follow-up.       I spent 25 minutes face to face with the patient and spent more than 50% of this time  in management, instructions and education regarding above mentioned diagnosis and also on counseling and discussing about taking medication regularly, possible side effects with medication use, importance of good sleep hygiene, good hydration and regular exercise.    No follow-ups on file.

## 2021-01-19 RX ORDER — NORETHINDRONE 0.35 MG/1
1 TABLET ORAL DAILY
Qty: 90 TABLET | Refills: 1 | Status: SHIPPED | OUTPATIENT
Start: 2021-01-19 | End: 2021-01-25 | Stop reason: SDUPTHER

## 2021-01-21 DIAGNOSIS — G43.019 INTRACTABLE MIGRAINE WITHOUT AURA AND WITHOUT STATUS MIGRAINOSUS: Primary | ICD-10-CM

## 2021-01-21 NOTE — TELEPHONE ENCOUNTER
Spoke to patient and informed her waiting for approval or denial on Aimovig and a refill request for Maxalt has been sent to Dr. Reed.

## 2021-01-21 NOTE — TELEPHONE ENCOUNTER
ELIM in regards to patient's Aimovig approval and stated once pharmacy received the PA confirmation they will get that filled for her.

## 2021-01-21 NOTE — TELEPHONE ENCOUNTER
Provider: DR LOVE    Caller: CA PEREZ     Relationship to Patient: SELF    Phone Number: [phone]  Reason for Call:  PT IS CALLING IN REGARDING PRE AUTH ON Erenumab-aooe (Aimovig) 140 MG/ML prefilled syringe   STATES WHEN SHE CALLED THE PHARMACY SHE WAS TOLD THAT THE PHARMACY HAS BEEN TRYING TO GET PRE AUTH FROM THIS OFFICE ..  PT IS ALSO REQUESTING A REFILL ON rizatriptan (MAXALT) 10 MG tablet PT HAS THREE PILLS OF THIS MEDICATION  LEFT      PLEASE ADVISE

## 2021-01-22 RX ORDER — RIZATRIPTAN BENZOATE 10 MG/1
10 TABLET ORAL ONCE AS NEEDED
Qty: 10 TABLET | Refills: 6 | OUTPATIENT
Start: 2021-01-22 | End: 2021-10-25

## 2021-01-25 RX ORDER — NORETHINDRONE 0.35 MG/1
1 TABLET ORAL DAILY
Qty: 90 TABLET | Refills: 1 | Status: SHIPPED | OUTPATIENT
Start: 2021-01-25 | End: 2021-07-13 | Stop reason: SDUPTHER

## 2021-01-25 NOTE — TELEPHONE ENCOUNTER
Annual 7/8//2020 with Denies - she refilled for the year but now she needs [facility] mail order

## 2021-03-05 ENCOUNTER — APPOINTMENT (OUTPATIENT)
Dept: MAMMOGRAPHY | Facility: HOSPITAL | Age: 46
End: 2021-03-05

## 2021-04-19 ENCOUNTER — HOSPITAL ENCOUNTER (OUTPATIENT)
Dept: MAMMOGRAPHY | Facility: HOSPITAL | Age: 46
Discharge: HOME | End: 2021-04-19
Admitting: OBSTETRICS & GYNECOLOGY

## 2021-04-19 DIAGNOSIS — Z12.31 VISIT FOR SCREENING MAMMOGRAM: ICD-10-CM

## 2021-04-19 PROCEDURE — 77063 BREAST TOMOSYNTHESIS BI: CPT | Performed by: RADIOLOGY

## 2021-04-19 PROCEDURE — 77063 BREAST TOMOSYNTHESIS BI: CPT

## 2021-04-19 PROCEDURE — 77067 SCR MAMMO BI INCL CAD: CPT

## 2021-04-19 PROCEDURE — 77067 SCR MAMMO BI INCL CAD: CPT | Performed by: RADIOLOGY

## 2021-05-13 RX ORDER — SODIUM, POTASSIUM,MAG SULFATES 17.5-3.13G
2 SOLUTION, RECONSTITUTED, ORAL ORAL TAKE AS DIRECTED
Qty: 354 ML | Refills: 0 | Status: SHIPPED | OUTPATIENT
Start: 2021-05-13 | End: 2021-08-31

## 2021-05-30 ENCOUNTER — APPOINTMENT (OUTPATIENT)
Dept: PREADMISSION TESTING | Facility: HOSPITAL | Age: 46
End: 2021-05-30

## 2021-07-13 ENCOUNTER — OFFICE VISIT (OUTPATIENT)
Dept: OBSTETRICS AND GYNECOLOGY | Facility: CLINIC | Age: 46
End: 2021-07-13

## 2021-07-13 VITALS
HEIGHT: 65 IN | WEIGHT: 234 LBS | DIASTOLIC BLOOD PRESSURE: 82 MMHG | SYSTOLIC BLOOD PRESSURE: 124 MMHG | BODY MASS INDEX: 38.99 KG/M2

## 2021-07-13 DIAGNOSIS — N95.1 MENOPAUSAL SYMPTOMS: ICD-10-CM

## 2021-07-13 DIAGNOSIS — Z01.419 WOMEN'S ANNUAL ROUTINE GYNECOLOGICAL EXAMINATION: Primary | ICD-10-CM

## 2021-07-13 DIAGNOSIS — R14.0 BLOATING: ICD-10-CM

## 2021-07-13 DIAGNOSIS — Z12.39 ENCOUNTER FOR BREAST CANCER SCREENING USING NON-MAMMOGRAM MODALITY: ICD-10-CM

## 2021-07-13 DIAGNOSIS — Z30.41 ENCOUNTER FOR SURVEILLANCE OF CONTRACEPTIVE PILLS: ICD-10-CM

## 2021-07-13 PROCEDURE — 99396 PREV VISIT EST AGE 40-64: CPT | Performed by: OBSTETRICS & GYNECOLOGY

## 2021-07-13 RX ORDER — SODIUM PICOSULFATE, MAGNESIUM OXIDE, AND ANHYDROUS CITRIC ACID 10; 3.5; 12 MG/160ML; G/160ML; G/160ML
LIQUID ORAL
COMMUNITY
Start: 2021-05-13 | End: 2021-08-31

## 2021-07-13 RX ORDER — NORETHINDRONE 0.35 MG/1
1 TABLET ORAL DAILY
Qty: 90 TABLET | Refills: 1 | Status: SHIPPED | OUTPATIENT
Start: 2021-07-13 | End: 2021-07-13 | Stop reason: SDUPTHER

## 2021-07-13 RX ORDER — CYANOCOBALAMIN 1000 UG/ML
INJECTION, SOLUTION INTRAMUSCULAR; SUBCUTANEOUS
COMMUNITY
Start: 2021-04-29 | End: 2021-10-25

## 2021-07-13 RX ORDER — NORETHINDRONE 0.35 MG/1
1 TABLET ORAL DAILY
Qty: 90 TABLET | Refills: 3 | Status: SHIPPED | OUTPATIENT
Start: 2021-07-13 | End: 2021-07-20

## 2021-07-13 NOTE — PROGRESS NOTES
GYN Annual Exam     CC - Here for annual exam. She has not had a hysterectomy and does have one or both ovaries.    Subjective   HPI  Moira Cardoso is a 45 y.o. female, , who presents for annual well woman exam.  She is perimenopausal. Her last LMP was Patient's last menstrual period was 2021..  Periods are absent, lasting 5 days.  Dysmenorrhea:moderate, occurring throughout cycle.  Patient reports problems with: pelvic pain  x2 months that comes and goes. She also reports daily bloating, night sweats since 2021 and itching skin for 2-3 weeks.   Partner Status: Marital Status: .  New Partners since last visit: no Desires STD Screening: no    Last mammogram:  2021- negative  Last Completed Mammogram          MAMMOGRAM (Yearly) Next due on 2021  Mammo Screening Digital Tomosynthesis Bilateral With CAD    2020  Mammo Screening Digital Tomosynthesis Bilateral With CAD    2018  Mammo Screening Digital Tomosynthesis Bilateral With CAD    2017  Mammo Screening Digital Tomosynthesis Bilateral With CAD    2016  Mammo screening digital tomosynthesis bilateral w cad    Only the first 5 history entries have been loaded, but more history exists.                Last colonoscopy :  Pending            Additional OB/GYN History     Current contraception: POP  Desires to: continue contraception  Last Pap : 2020- negative HPV non 16/18 negative  Last Completed Pap Smear          PAP SMEAR (Every 3 Years) Next due on 2020  Done - negative- HPV non 16/18 negative              History of abnormal Pap smear: yes -   Family history of uterine, colon, breast, or ovarian cancer: no  Performs monthly Self-Breast Exam: no  Parental Hip Fracture: no  Exercises Regularly: no  Feelings of Anxiety or Depression: no    Tobacco Usage?: No   OB History        1    Para   1    Term   1            AB        Living           SAB     "    TAB        Ectopic        Molar        Multiple        Live Births                    Health Maintenance   Topic Date Due   • Annual Gynecologic Pelvic and Breast Exam  Never done   • COLORECTAL CANCER SCREENING  Never done   • ANNUAL PHYSICAL  Never done   • TDAP/TD VACCINES (1 - Tdap) Never done   • HEPATITIS C SCREENING  Never done   • INFLUENZA VACCINE  08/01/2021   • MAMMOGRAM  04/19/2022   • PAP SMEAR  07/08/2023   • COVID-19 Vaccine  Completed   • Pneumococcal Vaccine 0-64  Aged Out       The additional following portions of the patient's history were reviewed and updated as appropriate: allergies, current medications, past family history, past medical history, past social history, past surgical history and problem list.    Review of Systems   Constitutional: Negative.    HENT: Negative.    Eyes: Negative.    Respiratory: Negative.    Cardiovascular: Negative.    Gastrointestinal: Negative.    Endocrine: Negative.    Genitourinary: Positive for pelvic pain.   Musculoskeletal: Negative.    Skin: Negative.    Allergic/Immunologic: Negative.    Neurological: Negative.    Hematological: Negative.    Psychiatric/Behavioral: Negative.        I have reviewed and agree with the HPI, ROS, and historical information as entered above. Gisel Bonilla MD    Objective   /82   Ht 165.1 cm (65\")   Wt 106 kg (234 lb)   LMP 01/06/2021   BMI 38.94 kg/m²     Physical Exam  Vitals and nursing note reviewed. Exam conducted with a chaperone present.   Constitutional:       Appearance: She is well-developed.   HENT:      Head: Normocephalic and atraumatic.   Neck:      Thyroid: No thyroid mass or thyromegaly.   Cardiovascular:      Rate and Rhythm: Normal rate and regular rhythm.      Heart sounds: No murmur heard.     Pulmonary:      Effort: Pulmonary effort is normal. No retractions.      Breath sounds: Normal breath sounds. No wheezing, rhonchi or rales.   Chest:      Chest wall: No mass or tenderness.      " Breasts:         Right: Normal. No mass, nipple discharge, skin change or tenderness.         Left: Normal. No mass, nipple discharge, skin change or tenderness.   Abdominal:      General: Bowel sounds are normal.      Palpations: Abdomen is soft. Abdomen is not rigid. There is no mass.      Tenderness: There is no abdominal tenderness. There is no guarding.      Hernia: No hernia is present. There is no hernia in the left inguinal area.   Genitourinary:     Labia:         Right: No rash, tenderness or lesion.         Left: No rash, tenderness or lesion.       Vagina: Normal. No vaginal discharge or lesions.      Cervix: No cervical motion tenderness, discharge, lesion or cervical bleeding.      Uterus: Normal. Not enlarged, not fixed and not tender.       Adnexa:         Right: No mass or tenderness.          Left: No mass or tenderness.        Rectum: No external hemorrhoid.   Musculoskeletal:      Cervical back: Normal range of motion. No muscular tenderness.   Neurological:      Mental Status: She is alert and oriented to person, place, and time.   Psychiatric:         Behavior: Behavior normal.         Assessment/Plan     Encounter Diagnoses   Name Primary?   • Women's annual routine gynecological examination Yes   • Encounter for breast cancer screening using non-mammogram modality    • Bloating    • Vaginal itching    • Encounter for surveillance of contraceptive pills    • Menopausal symptoms          Recommended use of Vitamin D replacement and getting adequate calcium in her diet. (1500mg)  Reviewed monthly self breast exams.  Instructed to call with lumps, pain, or breast discharge.    Continue yearly mammography  Reviewed HPV guidelines.  Reviewed exercise as a preventative health measures.   Perimenopausal sx - reviewed and she feels she can tolerate for right now.  Offered to return for u/s but she would like to wait and see how things go.  POP- counseled we can stop these at any time to see if she  resumes cycles and or do blood work.        Gisel Bonilla MD   07/13/2021

## 2021-07-20 RX ORDER — NORETHINDRONE 0.35 MG/1
TABLET ORAL
Qty: 84 TABLET | Refills: 3 | Status: SHIPPED | OUTPATIENT
Start: 2021-07-20 | End: 2022-08-03

## 2021-08-31 RX ORDER — SODIUM, POTASSIUM,MAG SULFATES 17.5-3.13G
2 SOLUTION, RECONSTITUTED, ORAL ORAL TAKE AS DIRECTED
Qty: 354 ML | Refills: 0 | OUTPATIENT
Start: 2021-08-31 | End: 2021-10-25

## 2021-09-15 ENCOUNTER — OUTSIDE FACILITY SERVICE (OUTPATIENT)
Dept: GASTROENTEROLOGY | Facility: CLINIC | Age: 46
End: 2021-09-15

## 2021-09-15 PROCEDURE — 45378 DIAGNOSTIC COLONOSCOPY: CPT | Performed by: INTERNAL MEDICINE

## 2021-11-09 ENCOUNTER — OFFICE VISIT (OUTPATIENT)
Dept: NEUROLOGY | Facility: CLINIC | Age: 46
End: 2021-11-09

## 2021-11-09 ENCOUNTER — TELEPHONE (OUTPATIENT)
Dept: NEUROLOGY | Facility: CLINIC | Age: 46
End: 2021-11-09

## 2021-11-09 VITALS — OXYGEN SATURATION: 97 % | HEIGHT: 65 IN | HEART RATE: 94 BPM | WEIGHT: 225 LBS | BODY MASS INDEX: 37.49 KG/M2

## 2021-11-09 DIAGNOSIS — G43.019 INTRACTABLE MIGRAINE WITHOUT AURA AND WITHOUT STATUS MIGRAINOSUS: Primary | ICD-10-CM

## 2021-11-09 PROCEDURE — 99214 OFFICE O/P EST MOD 30 MIN: CPT | Performed by: PSYCHIATRY & NEUROLOGY

## 2021-11-09 RX ORDER — RIZATRIPTAN BENZOATE 10 MG/1
10 TABLET ORAL ONCE AS NEEDED
Qty: 10 TABLET | Refills: 6 | Status: SHIPPED | OUTPATIENT
Start: 2021-11-09 | End: 2023-03-27

## 2021-11-09 RX ORDER — ERENUMAB-AOOE 140 MG/ML
140 INJECTION, SOLUTION SUBCUTANEOUS
Qty: 1.12 ML | Refills: 11 | Status: SHIPPED | OUTPATIENT
Start: 2021-11-09 | End: 2022-03-10

## 2021-11-09 NOTE — PROGRESS NOTES
Subjective:    CC: Moira Cardoso is in clinic today for follow up for migraines.    HPI:  9/19/2019: She is in clinic for regular follow-up.  Since her last visit, she has been taking Ajovy every month.  She is now done about 5 Ajovy injections.  She reports that the effect with Ajovy is as not as good as what it was with Aimovig.  With Aimovig, she had complete resolution of headaches but with Ajovy, she is getting 3-4 breakthrough headaches in a month.  Still better than more than 15 headaches that she was experiencing prior to starting CGRP blockers.  She takes rizatriptan or ibuprofen as needed as an abortive therapy and it works really well.  She denies any side effects with Ajovy use.  She used Aimovig for 6 months prior to Ajovy and then insurance denied it.    9/9/2020: She is in clinic for regular follow-up.  Since her last visit 1 year ago, she reports that in the last 3 to 4 months, migraine intensity and frequency is slightly worse.  She is now experiencing 5-6 breakthrough migraines increase from 2-3 breakthrough migraines.  She is on Ajovy monthly injections and has done it for 1 year now.  Prior to Ajovy, she was on Aimovig and it had helped her significantly in controlling migraines but unfortunately insurance denied it.  She currently takes Maxalt as needed as an abortive treatment and does report side effects with Maxalt use.    1/13/2021: She is in clinic for regular follow-up.  Since her last visit in September, insurance did not approve Emgality.  She did try Ajovy prior to trying Emgality for about 6 months but it did not really help.  She had an excellent response with Aimovig monthly injection.  In the past, she has tried and failed Topamax and propranolol.    11/9/2021: She is in clinic for regular follow-up.  Since her last visit in January 2021, Aimovig is now approved by insurance and she has been taking it every month.  She reports that she has had excellent response with Aimovig and  "has had almost complete resolution of migraines.  Whenever she gets breakthrough migraine, she will use rizatriptan at the onset and it does work as an abortive treatment.      The following portions of the patient's history were reviewed and updated as of 2021: allergies, social history and problem list.       Current Outpatient Medications:   •  Erenumab-aooe (Aimovig) 140 MG/ML prefilled syringe, Inject 1 mL under the skin into the appropriate area as directed Every 30 (Thirty) Days., Disp: 1.12 mL, Rfl: 11  •  Ilda 0.35 MG tablet, TAKE 1 TABLET DAILY, Disp: 84 tablet, Rfl: 3  •  imiquimod (ALDARA) 5 % cream, , Disp: , Rfl:   •  pantoprazole (PROTONIX) 40 MG EC tablet, , Disp: , Rfl:   •  rizatriptan (MAXALT) 10 MG tablet, Take 1 tablet by mouth 1 (One) Time As Needed for Migraine for up to 30 doses. May repeat in 2 hours if needed, Disp: 10 tablet, Rfl: 6   Past Medical History:   Diagnosis Date   • Migraine       Past Surgical History:   Procedure Laterality Date   •  SECTION     • DENTAL PROCEDURE        Family History   Problem Relation Age of Onset   • Mental illness Mother    • Alzheimer's disease Maternal Grandmother    • Parkinsonism Paternal Grandfather    • Breast cancer Neg Hx    • Ovarian cancer Neg Hx         Review of Systems   Constitutional: Negative.    HENT: Negative.    Eyes: Negative.    Respiratory: Negative.    Cardiovascular: Negative.    Gastrointestinal: Negative.    Endocrine: Negative.    Genitourinary: Negative.    Musculoskeletal: Negative.    Skin: Negative.    Allergic/Immunologic: Negative.    Hematological: Negative.    Psychiatric/Behavioral: Negative.      Objective:    Pulse 94   Ht 165.1 cm (65\")   Wt 102 kg (225 lb)   SpO2 97%   BMI 37.44 kg/m²     Neurology Exam:  General apperance: NAD.     Mental status: Alert, awake and oriented to time place and person.    Recent and Remote memory: Can recall 3/3 objects at 5 minutes. Can recall historical events. "     Attention span and Concentration: Serial 7s: Normal.     Fund of knowledge:  Normal.     Language and Speech: No aphasia or dysarthria.    Naming , Repitition and Comprehension:  Can name objects, repeat a sentence and follow commands. Speech is clear and fluent with good repetition, comprehension, and naming.    CN II to XII: Intact.    Opthalmoscopic Exam: No papilledema.    Motor:  Right UE muscle strength 5/5. Normal tone.     Left UE muscle strength 5/5. Normal tone.      Right LE muscle strength5/5. Normal tone.     Left LE muscle strength 5/5. Normal tone.      Sensory: Normal light touch, vibration and pinprick sensation bilaterally.    DTRs: 2+ bilaterally.    Babinski: Negative bilaterally.    Co-ordination: Normal finger-to-nose, heel to shin B/L.    Rhomberg: Negative.    Gait: Normal.    Cardiovascular: Regular rate and rhythm without murmur, gallop or rub.    Assessment and Plan:  1. Intractable migraine without aura and without status migrainosus  With insurance approving Aimovig and reinitiation of Aimovig, she has had excellent response with almost complete resolution of migraines.  In the past, she has tried Ajovy and Emgality without success.  Continue with Aimovig with injection with rizatriptan as needed as an abortive treatment and I will see her back in clinic in 6 months for follow-up.  - rizatriptan (MAXALT) 10 MG tablet; Take 1 tablet by mouth 1 (One) Time As Needed for Migraine for up to 30 doses. May repeat in 2 hours if needed  Dispense: 10 tablet; Refill: 6       I spent 30 minutes face to face with the patient and spent more than 50% of this time  in management, instructions and education regarding above mentioned diagnosis and also on counseling and discussing about taking medication regularly, possible side effects with medication use, importance of good sleep hygiene, good hydration and regular exercise.    Return in about 6 months (around 5/9/2022).

## 2022-01-25 ENCOUNTER — TELEPHONE (OUTPATIENT)
Dept: NEUROLOGY | Facility: CLINIC | Age: 47
End: 2022-01-25

## 2022-01-25 NOTE — TELEPHONE ENCOUNTER
Caller: [place]  Relationship: SELF  Best call back number: [phone]    What medications are you currently taking:   Current Outpatient Medications on File Prior to Visit   Medication Sig Dispense Refill   • Erenumab-aooe (Aimovig) 140 MG/ML prefilled syringe Inject 1 mL under the skin into the appropriate area as directed Every 30 (Thirty) Days. 1.12 mL 11   • Ilda 0.35 MG tablet TAKE 1 TABLET DAILY 84 tablet 3   • imiquimod (ALDARA) 5 % cream      • pantoprazole (PROTONIX) 40 MG EC tablet      • rizatriptan (MAXALT) 10 MG tablet Take 1 tablet by mouth 1 (One) Time As Needed for Migraine for up to 30 doses. May repeat in 2 hours if needed 10 tablet 6     No current facility-administered medications on file prior to visit.        Which medication are you concerned about: AIMOVIG    Who prescribed you this medication:     What are your concerns: PT RECEIVED A LETTER POST DATED 1-21-22 FROM INSURANCE STATING HER AIMOVIG WAS DENIED D/T NOT HAVING FAILED MEDICATIONS PRIOR. PT STATES SHE THOUGHT ALL THAT WAS HANDLED LAST YEAR, PLEASE REVIEW AND ADVISE    PT'S NEXT DUE DATE FOR INJECTION IS 1-29-22     How long have you had these concerns: 1-21-22

## 2022-01-26 NOTE — TELEPHONE ENCOUNTER
Informed patient that I have not heard back from insurance but she is more than welcome to stop by the office and  one If we have not received a determination before Saturday which is when her next injection is due.

## 2022-03-07 ENCOUNTER — TELEPHONE (OUTPATIENT)
Dept: NEUROLOGY | Facility: CLINIC | Age: 47
End: 2022-03-07

## 2022-03-07 NOTE — TELEPHONE ENCOUNTER
DR KATE PENALOZA (PATIENT)    [phone]    CALLING TO FOLLOW UP ON AIMOVIG APPEAL. SHE HAS ABOUT 3 WEEKS LEFT UNTIL WILL BE DUE FOR NEXT INJECTION    ANY UPDATE ON STATUS OF APPEAL?    PLEASE ADVISE

## 2022-03-10 RX ORDER — FREMANEZUMAB-VFRM 225 MG/1.5ML
225 INJECTION SUBCUTANEOUS
Qty: 1.5 ML | Refills: 11 | Status: SHIPPED | OUTPATIENT
Start: 2022-03-10 | End: 2023-03-10

## 2022-03-14 ENCOUNTER — TRANSCRIBE ORDERS (OUTPATIENT)
Dept: ADMINISTRATIVE | Facility: HOSPITAL | Age: 47
End: 2022-03-14

## 2022-03-14 DIAGNOSIS — Z12.31 VISIT FOR SCREENING MAMMOGRAM: Primary | ICD-10-CM

## 2022-03-29 NOTE — TELEPHONE ENCOUNTER
Informed patient that since the aimovig was denied by insurance that the ajovy was sent in. If that does not help we will try to resend in the aimovig so hopefully insurance will cover it.

## 2022-03-29 NOTE — TELEPHONE ENCOUNTER
Caller: Moira Cardoso    Relationship: Self    Best call back number: [phone]    What medications are you currently taking:   Current Outpatient Medications on File Prior to Visit   Medication Sig Dispense Refill   • Ilda 0.35 MG tablet TAKE 1 TABLET DAILY 84 tablet 3   • imiquimod (ALDARA) 5 % cream      • pantoprazole (PROTONIX) 40 MG EC tablet      • rizatriptan (MAXALT) 10 MG tablet Take 1 tablet by mouth 1 (One) Time As Needed for Migraine for up to 30 doses. May repeat in 2 hours if needed 10 tablet 6     No current facility-administered medications on file prior to visit.              Which medication are you concerned about:  AIMOVIG     Who prescribed you this medication:  DR LOVE     What are your concerns:  PT CALLED IN FOR REFILL WENT TO PHARMACY TO  WAS GIVEN AJOVY . PT STATES SHE HAS  TREID THIS MED BEFORE AND IT DID NOT WORK SHE IS OPEN TO TRY THIS FOR A WHILE BUT WOULD PREFER THE AIMOVIG AS IT WAS WORKING PT IS NOT ABLE TO PAY THE HIGH COST FOR THE AIMOVIG AND IS WILLING TO TRY AJOVY BUT WANTS TO MAKE SURE THIS IS WHAT SHE IS SUPPOSED TO DO  OR THAT THERE ARE NO THERE OPTIONS   PLEASE CALL PT TO DISCUSS ANY MED CHANGES .

## 2022-04-20 ENCOUNTER — HOSPITAL ENCOUNTER (OUTPATIENT)
Dept: MAMMOGRAPHY | Facility: HOSPITAL | Age: 47
Discharge: HOME | End: 2022-04-20
Admitting: OBSTETRICS & GYNECOLOGY

## 2022-04-20 DIAGNOSIS — Z12.31 VISIT FOR SCREENING MAMMOGRAM: ICD-10-CM

## 2022-04-20 PROCEDURE — 77067 SCR MAMMO BI INCL CAD: CPT | Performed by: RADIOLOGY

## 2022-04-20 PROCEDURE — 77063 BREAST TOMOSYNTHESIS BI: CPT

## 2022-04-20 PROCEDURE — 77063 BREAST TOMOSYNTHESIS BI: CPT | Performed by: RADIOLOGY

## 2022-04-20 PROCEDURE — 77067 SCR MAMMO BI INCL CAD: CPT

## 2022-06-20 ENCOUNTER — OFFICE VISIT (OUTPATIENT)
Dept: NEUROLOGY | Facility: CLINIC | Age: 47
End: 2022-06-20

## 2022-06-20 VITALS — HEART RATE: 82 BPM | DIASTOLIC BLOOD PRESSURE: 64 MMHG | SYSTOLIC BLOOD PRESSURE: 118 MMHG | OXYGEN SATURATION: 99 %

## 2022-06-20 DIAGNOSIS — G43.019 INTRACTABLE MIGRAINE WITHOUT AURA AND WITHOUT STATUS MIGRAINOSUS: Primary | ICD-10-CM

## 2022-06-20 PROCEDURE — 99214 OFFICE O/P EST MOD 30 MIN: CPT | Performed by: PSYCHIATRY & NEUROLOGY

## 2022-06-20 NOTE — PROGRESS NOTES
Subjective:    CC: Moira Cardoso is in clinic today for follow up for migraines.    HPI:  9/19/2019: She is in clinic for regular follow-up.  Since her last visit, she has been taking Ajovy every month.  She is now done about 5 Ajovy injections.  She reports that the effect with Ajovy is as not as good as what it was with Aimovig.  With Aimovig, she had complete resolution of headaches but with Ajovy, she is getting 3-4 breakthrough headaches in a month.  Still better than more than 15 headaches that she was experiencing prior to starting CGRP blockers.  She takes rizatriptan or ibuprofen as needed as an abortive therapy and it works really well.  She denies any side effects with Ajovy use.  She used Aimovig for 6 months prior to Ajovy and then insurance denied it.    9/9/2020: She is in clinic for regular follow-up.  Since her last visit 1 year ago, she reports that in the last 3 to 4 months, migraine intensity and frequency is slightly worse.  She is now experiencing 5-6 breakthrough migraines increase from 2-3 breakthrough migraines.  She is on Ajovy monthly injections and has done it for 1 year now.  Prior to Ajovy, she was on Aimovig and it had helped her significantly in controlling migraines but unfortunately insurance denied it.  She currently takes Maxalt as needed as an abortive treatment and does report side effects with Maxalt use.    1/13/2021: She is in clinic for regular follow-up.  Since her last visit in September, insurance did not approve Emgality.  She did try Ajovy prior to trying Emgality for about 6 months but it did not really help.  She had an excellent response with Aimovig monthly injection.  In the past, she has tried and failed Topamax and propranolol.    11/9/2021: She is in clinic for regular follow-up.  Since her last visit in January 2021, Aimovig is now approved by insurance and she has been taking it every month.  She reports that she has had excellent response with Aimovig and  has had almost complete resolution of migraines.  Whenever she gets breakthrough migraine, she will use rizatriptan at the onset and it does work as an abortive treatment.    2022: She is in clinic for regular follow-up.  Since her last visit in 2021, she reports that while he was reevaluated excellent control.  She was on Aimovig which was switched to Ajovy due to insurance.  Initially with Ajovy, migraines were little worse but with continued use of Ajovy, they have become better.  She reports that she has been migraine free for last several months and did not have to take Maxalt.  She is tolerating Ajovy well without any side effects as well.    The following portions of the patient's history were reviewed and updated as of 2022: allergies, social history and problem list.       Current Outpatient Medications:   •  Fremanezumab-vfrm (Ajovy) 225 MG/1.5ML solution auto-injector, Inject 225 mg under the skin into the appropriate area as directed Every 30 (Thirty) Days., Disp: 1.5 mL, Rfl: 11  •  Ilda 0.35 MG tablet, TAKE 1 TABLET DAILY, Disp: 84 tablet, Rfl: 3  •  imiquimod (ALDARA) 5 % cream, , Disp: , Rfl:   •  pantoprazole (PROTONIX) 40 MG EC tablet, , Disp: , Rfl:   •  rizatriptan (MAXALT) 10 MG tablet, Take 1 tablet by mouth 1 (One) Time As Needed for Migraine for up to 30 doses. May repeat in 2 hours if needed, Disp: 10 tablet, Rfl: 6   Past Medical History:   Diagnosis Date   • Migraine       Past Surgical History:   Procedure Laterality Date   •  SECTION     • DENTAL PROCEDURE        Family History   Problem Relation Age of Onset   • Mental illness Mother    • Alzheimer's disease Maternal Grandmother    • Parkinsonism Paternal Grandfather    • Breast cancer Neg Hx    • Ovarian cancer Neg Hx         Review of Systems  Objective:    /64   Pulse 82   SpO2 99%     Neurology Exam:  General apperance: NAD.     Mental status: Alert, awake and oriented to time place and  person.    Recent and Remote memory: Can recall 3/3 objects at 5 minutes. Can recall historical events.     Attention span and Concentration: Serial 7s: Normal.     Fund of knowledge:  Normal.     Language and Speech: No aphasia or dysarthria.    Naming , Repitition and Comprehension:  Can name objects, repeat a sentence and follow commands. Speech is clear and fluent with good repetition, comprehension, and naming.    CN II to XII: Intact.    Opthalmoscopic Exam: No papilledema.    Motor:  Right UE muscle strength 5/5. Normal tone.     Left UE muscle strength 5/5. Normal tone.      Right LE muscle strength5/5. Normal tone.     Left LE muscle strength 5/5. Normal tone.      Sensory: Normal light touch, vibration and pinprick sensation bilaterally.    DTRs: 2+ bilaterally.    Babinski: Negative bilaterally.    Co-ordination: Normal finger-to-nose, heel to shin B/L.    Rhomberg: Negative.    Gait: Normal.    Cardiovascular: Regular rate and rhythm without murmur, gallop or rub.    Assessment and Plan:  1. Intractable migraine without aura and without status migrainosus  Under excellent control with use of Ajovy monthly injection.  Continue with Ajovy as it is and I will see her back in clinic in 6 months for follow-up.       I spent 30 minutes in patient care: Reviewing records prior to the visit, entering orders and documentation and spent more than torres 50% of this time face-to-face in management, instructions and education regarding above mentioned diagnosis and also on counseling and discussing about taking medication regularly, possible side effects with medication use, importance of good sleep hygiene, good hydration and regular exercise.    Return in about 6 months (around 12/20/2022).

## 2022-07-15 ENCOUNTER — TELEPHONE (OUTPATIENT)
Dept: NEUROLOGY | Facility: CLINIC | Age: 47
End: 2022-07-15

## 2022-07-15 NOTE — TELEPHONE ENCOUNTER
Caller: Moira Cardoso     Relationship: [unfilled]     Best call back number:[phone]  What is your medical concern? PATIENT WAS SEEN BY DR. SANCHEZ, ENT, FOR EAR PAIN, HE TOLD HER THE EAR PAIN IS ACTUALLY A MIGRAINE.  PATIENT IS WANTING TO KNOW WHAT SHE CAN DO ABOUT THE EAR PAIN.    PLEASE CALL AND ADVISE      How long has this issue been going on? 1 YEAR, GETTING WORSE IN THE LAST 2 MONTHS    Is your provider already aware of this issue? NO    Have you been treated for this issue? NO

## 2022-08-03 ENCOUNTER — OFFICE VISIT (OUTPATIENT)
Dept: OBSTETRICS AND GYNECOLOGY | Facility: CLINIC | Age: 47
End: 2022-08-03

## 2022-08-03 VITALS
DIASTOLIC BLOOD PRESSURE: 82 MMHG | BODY MASS INDEX: 40.89 KG/M2 | HEIGHT: 65 IN | SYSTOLIC BLOOD PRESSURE: 120 MMHG | WEIGHT: 245.4 LBS

## 2022-08-03 DIAGNOSIS — Z01.419 WOMEN'S ANNUAL ROUTINE GYNECOLOGICAL EXAMINATION: Primary | ICD-10-CM

## 2022-08-03 DIAGNOSIS — Z12.39 ENCOUNTER FOR BREAST CANCER SCREENING USING NON-MAMMOGRAM MODALITY: ICD-10-CM

## 2022-08-03 DIAGNOSIS — Z78.0 POSTMENOPAUSAL STATUS: ICD-10-CM

## 2022-08-03 PROCEDURE — 99396 PREV VISIT EST AGE 40-64: CPT | Performed by: OBSTETRICS & GYNECOLOGY

## 2022-08-03 NOTE — PROGRESS NOTES
Gynecologic Annual Exam Note        GYN Annual Exam     CC - Here for annual exam.        HPI  Moira Cardoso is a 47 y.o. female, , who presents for annual well woman exam as a established patient.  She is postmenopausal. Denies vaginal bleeding.  Patient reports problems with: none. There were no changes to her medical or surgical history since her last visit.. Partner Status: Marital Status: .  She is is sexually active. She has not had new partners.. STD testing recommendations have been explained to the patient and she does not desire STD testing.    Additional OB/GYN History   On HRT? No    Last Pap : 2021. Results: negative. HPV: negative  Last Completed Pap Smear          Ordered - PAP SMEAR (Every 3 Years) Ordered on 8/3/2022    2020  Done - negative- HPV non 16/18 negative              History of abnormal Pap smear: yes - many years ago  Family history of uterine, colon, breast, or ovarian cancer: no  Performs monthly Self-Breast Exam: no  Last mammogram: 22. Done at .    Last Completed Mammogram          MAMMOGRAM (Yearly) Order placed this encounter    2022  Mammo Screening Digital Tomosynthesis Bilateral With CAD    2021  Mammo Screening Digital Tomosynthesis Bilateral With CAD    2020  Mammo Screening Digital Tomosynthesis Bilateral With CAD    2018  Mammo Screening Digital Tomosynthesis Bilateral With CAD    2017  Mammo Screening Digital Tomosynthesis Bilateral With CAD    Only the first 5 history entries have been loaded, but more history exists.              Last colonoscopy: has had a colonoscopy 1 year(s) ago.    Last Completed Colonoscopy          COLORECTAL CANCER SCREENING (COLONOSCOPY - Every 10 Years) Next due on 9/15/2031    09/15/2021  SCANNED - COLONOSCOPY                  Last bone density scan (DEXA): None  Exercises Regularly: no  Feelings of Anxiety or Depression: no      Tobacco Usage?: No       Current Outpatient  "Medications:   •  Fremanezumab-vfrm (Ajovy) 225 MG/1.5ML solution auto-injector, Inject 225 mg under the skin into the appropriate area as directed Every 30 (Thirty) Days., Disp: 1.5 mL, Rfl: 11  •  rizatriptan (MAXALT) 10 MG tablet, Take 1 tablet by mouth 1 (One) Time As Needed for Migraine for up to 30 doses. May repeat in 2 hours if needed, Disp: 10 tablet, Rfl: 6    Patient denies the need for medication refills today.    OB History        1    Para   1    Term   1            AB        Living           SAB        IAB        Ectopic        Molar        Multiple        Live Births                    Past Medical History:   Diagnosis Date   • Migraine         Past Surgical History:   Procedure Laterality Date   •  SECTION     • DENTAL PROCEDURE         Health Maintenance   Topic Date Due   • ANNUAL PHYSICAL  Never done   • TDAP/TD VACCINES (1 - Tdap) Never done   • HEPATITIS C SCREENING  Never done   • COVID-19 Vaccine (3 - Booster for Pfizer series) 2021   • Annual Gynecologic Pelvic and Breast Exam  2022   • INFLUENZA VACCINE  10/01/2022   • MAMMOGRAM  2023   • PAP SMEAR  2023   • COLORECTAL CANCER SCREENING  09/15/2031   • Pneumococcal Vaccine 0-64  Aged Out       The additional following portions of the patient's history were reviewed and updated as appropriate: allergies, current medications, past family history, past medical history, past social history, past surgical history and problem list.    Review of Systems    I have reviewed and agree with the HPI, ROS, and historical information as entered above. Gisel Bonilla MD    Objective   /82   Ht 165.1 cm (65\")   Wt 111 kg (245 lb 6.4 oz)   LMP 2021   BMI 40.84 kg/m²     Physical Exam  Vitals and nursing note reviewed. Exam conducted with a chaperone present.   Constitutional:       Appearance: She is well-developed.   HENT:      Head: Normocephalic and atraumatic.   Neck:      Thyroid: No thyroid " mass or thyromegaly.   Cardiovascular:      Rate and Rhythm: Normal rate and regular rhythm.      Heart sounds: No murmur heard.  Pulmonary:      Effort: Pulmonary effort is normal. No retractions.      Breath sounds: Normal breath sounds. No wheezing, rhonchi or rales.   Chest:      Chest wall: No mass or tenderness.   Breasts:      Right: Normal. No mass, nipple discharge, skin change or tenderness.      Left: Normal. No mass, nipple discharge, skin change or tenderness.       Abdominal:      General: Bowel sounds are normal.      Palpations: Abdomen is soft. Abdomen is not rigid. There is no mass.      Tenderness: There is no abdominal tenderness. There is no guarding.      Hernia: No hernia is present. There is no hernia in the left inguinal area.   Genitourinary:     Labia:         Right: No rash, tenderness or lesion.         Left: No rash, tenderness or lesion.       Vagina: Normal. No vaginal discharge or lesions.      Cervix: No cervical motion tenderness, discharge, lesion or cervical bleeding.      Uterus: Normal. Not enlarged, not fixed and not tender.       Adnexa:         Right: No mass or tenderness.          Left: No mass or tenderness.        Rectum: No external hemorrhoid.   Musculoskeletal:      Cervical back: Normal range of motion. No muscular tenderness.   Neurological:      Mental Status: She is alert and oriented to person, place, and time.   Psychiatric:         Behavior: Behavior normal.            Assessment and Plan    Problem List Items Addressed This Visit    None     Visit Diagnoses     Women's annual routine gynecological examination    -  Primary    Relevant Orders    LIQUID-BASED PAP SMEAR, [facility] ([facility])    Encounter for breast cancer screening using non-mammogram modality        Postmenopausal status              1. GYN annual well woman exam.   2. Recommended use of Vitamin D replacement and getting adequate calcium in her diet. (1500mg)  3. Reviewed monthly self breast  exams.  Instructed to call with lumps, pain, or breast discharge.    4. Continue yearly mammography  5. Reviewed HPV guidelines.  6. Anticipatory menopausal guidance given.  Encouraged vitamin D consideration of vaginal estrogen if dryness starts to be a problem.  For now she does not feel the sx warrant intervention.  7.  Return in about 1 year (around 8/3/2023).     Gisel Bonilla MD  08/03/2022

## 2022-08-08 LAB — REF LAB TEST METHOD: NORMAL

## 2022-08-26 ENCOUNTER — PRIOR AUTHORIZATION (OUTPATIENT)
Dept: NEUROLOGY | Facility: CLINIC | Age: 47
End: 2022-08-26

## 2023-01-05 ENCOUNTER — TRANSCRIBE ORDERS (OUTPATIENT)
Dept: NUTRITION | Facility: HOSPITAL | Age: 48
End: 2023-01-05
Payer: COMMERCIAL

## 2023-01-05 DIAGNOSIS — E78.5 HYPERLIPIDEMIA, UNSPECIFIED HYPERLIPIDEMIA TYPE: ICD-10-CM

## 2023-01-05 DIAGNOSIS — E66.9 OBESITY, UNSPECIFIED CLASSIFICATION, UNSPECIFIED OBESITY TYPE, UNSPECIFIED WHETHER SERIOUS COMORBIDITY PRESENT: Primary | ICD-10-CM

## 2023-01-05 DIAGNOSIS — K21.9 GASTROESOPHAGEAL REFLUX DISEASE, UNSPECIFIED WHETHER ESOPHAGITIS PRESENT: ICD-10-CM

## 2023-01-05 DIAGNOSIS — R73.03 PREDIABETES: ICD-10-CM

## 2023-01-05 DIAGNOSIS — E53.8 VITAMIN B12 DEFICIENCY: ICD-10-CM

## 2023-02-21 ENCOUNTER — HOSPITAL ENCOUNTER (OUTPATIENT)
Dept: NUTRITION | Facility: HOSPITAL | Age: 48
Setting detail: RECURRING SERIES
Discharge: HOME | End: 2023-02-21

## 2023-02-21 PROCEDURE — 97802 MEDICAL NUTRITION INDIV IN: CPT

## 2023-02-21 NOTE — CONSULTS
[facility]          Initial 60 Minute Nutrition Visit    Date: 2023   Patient Name: Moira Cardoso  : 1975   MRN: [id number]   Referring Provider: Risa Holm MD    Reason for Visit: Weight management, GERD, PreDM, HLD, VIT B12 deficiency   Visit Format: PHONE     Nutrition Assessment       Social History:   Social History     Socioeconomic History   • Marital status:    Tobacco Use   • Smoking status: Never   • Smokeless tobacco: Never   Substance and Sexual Activity   • Alcohol use: Yes     Comment: occasionally   • Drug use: No   • Sexual activity: Yes     Birth control/protection: OCP     Active Problem List:   Patient Active Problem List    Diagnosis    • Class 2 obesity due to excess calories without serious comorbidity with body mass index (BMI) of 38.0 to 38.9 in adult [E66.09, Z68.38]       Current Medications:   Current Outpatient Medications:   •  Fremanezumab-vfrm (Ajovy) 225 MG/1.5ML solution auto-injector, Inject 225 mg under the skin into the appropriate area as directed Every 30 (Thirty) Days., Disp: 1.5 mL, Rfl: 11  •  rizatriptan (MAXALT) 10 MG tablet, Take 1 tablet by mouth 1 (One) Time As Needed for Migraine for up to 30 doses. May repeat in 2 hours if needed, Disp: 10 tablet, Rfl: 6    Hunger Vital Sign Food Insecurity Assessment:  Within the past 12 months I/we worried whether our food would run out before I/we got money to buy more: Not disclosed    Within the past 12 months the food I/we bought just didn't last and I/we didn't have money to get more: Not disclosed    Use of food assistance programs (WIC, food stamps, food babin) no       Food & Nutrition Related History       Food Allergies: NKFA  Food Intolerances: no  Nutrition Impact Symptoms: occasional bloating , abdominal pain; Reflux/Heartburn sometimes   Gastrointestinal conditions that impact intake or food choices: GERD diagnosis   Details at home: lives with spouse and 12  "year old son   Who prepares most meals: patient or gets fast food/take out   Who does grocery shopping: patient   How many meals are purchased from fast food/sit down restaurants per week: 2-4  Difficulty chewing: no  Difficulty swallowing: no  Diet requirement related to personal preference or cultural belief: in general, a \"healthy\" diet    History of eating disorder/disordered eating habits: None  Language/communication details: english/no communication barriers   Barriers to learning: No barriers identified at this time    24 Hour Recall:   Time Food/beverages consumed   Breakfast 2 (10 oz) coffees with 2% milk, 1 pkt instant oatmeal, banana        Lunch  One indiv. Portion cheddar cheese, 12-15 wheat thins, 1 cup baby carrots, 1 cup strawberries/blueberries   PM snack  1 individual size bag Skinny Pop    Dinner  4 oz chicken breast, kieran lettuce salad with tomato, corn, BBQ sauce/yogurt ranch dressing        HS snack  Candy/chocolate (leftover from Valentine's day)         Additional comments: n/a     Anthropometrics      Height:   Ht Readings from Last 1 Encounters:   08/03/22 165.1 cm (65\")     Weight: 249 lb (113.2 kg) (12/13/2022)    BMI: There is no height or weight on file to calculate BMI.     Weight Change: patient reports that since January 2023 she has lost 14 lb. Reports that in the past she has consistently gained ~15 lb per year since her 11 y/o son was born.     Physical Activity     Activity  Frequency Duration                                         Barriers to physical activity: back problems hindering movement, open to incorporating walking,core workouts intermittently.     Physical activity comments: n/a     Estimated Needs     Estimated Energy Needs: MSJ x 1.2/1.3-500= ~1715 kcal per day     Estimated Protein Needs: 0.8 g/kg= ~ 90 g protein per day     Estimated Fluid Needs: 1 mL/kcal= ~ 1715 mL per day      Discussion / Education      Patient reports for her initial visit today via " telephone. She reports that she has lost ~14 lb since January 2023 by participating in a weight loss challenge with friends. She has been making a few habit changes, including cutting out coke zero/all soda intake, she has been decreasing her candy/dessert intake. Her main focus at this time is on making better choices overall. She discloses that she is fairly busy. She has a son involved in many activities, not leaving her much time to prepare healthy meals at home on weeknights. She has been trying to decrease the amount in which she consumes fast food (or when she does she has been getting salads as able). Patient would like assistance with healthy meal choices that are convenient, as well as something she might be able to make ahead of time and have on the go. RD spoke with pt about abiding by the my plate method to ensure that they are providing their body with adequate macro and micronutrients from multiple food groups, we spoke about the importance of a balanced diet in achieving weight loss that is long term. RD spoke with pt about the importance of portion sizes as well. Portion control plays an important role in being more mindful of calorie intake. Patient is motivated at this time to continue with changes she has been making as well as incorporating new additions talked about today. RD spoke with pt about the importance of implementing exercise as able into their routine to assist in muscle maintenance and betterment of overall health. RD encouraged implementation of walking, yoga, low impact exercises, as well as strength training. Provided pt with resources to instill this into their daily regimen as they feel open to it.       Assessment of patient engagement: Engaged, asked appropriate questions     Measurement of understanding: Patient verbalized understanding    Resources Provided: plate method, weight loss toolkit, meal planning guide, high fiber foods/snacks      Goal (s)      Goal 1:  Increase  fiber intake to at least 25-30 g fiber daily.     Goal 2: continue to consume 3 balanced meals daily consistently; attempting to adhere to plate method as able     Goal 3: incorporate physical activity (walking/core exercises)    Goal 4: Continued healthful weight loss     Plan of Care     PES Statement:   Overweight / Obesity related to diet and lifestye as evidenced by BMI.     Follow Up Visit      Follow Up:   3/21/2023 @ 10:15 AM     Total of 60 minutes spent with patient on nutrition counseling. Education based on Academy of Nutrition and Dietetics guidelines. Patient was provided with RD's contact information. Thank you for this referral.      Sandy Butterfield RD, LD   Time Spent: 60 minutes

## 2023-03-20 ENCOUNTER — OFFICE VISIT (OUTPATIENT)
Dept: NEUROLOGY | Facility: CLINIC | Age: 48
End: 2023-03-20
Payer: COMMERCIAL

## 2023-03-20 VITALS
WEIGHT: 230 LBS | SYSTOLIC BLOOD PRESSURE: 136 MMHG | OXYGEN SATURATION: 96 % | HEART RATE: 89 BPM | BODY MASS INDEX: 38.32 KG/M2 | HEIGHT: 65 IN | DIASTOLIC BLOOD PRESSURE: 86 MMHG

## 2023-03-20 DIAGNOSIS — G43.019 INTRACTABLE MIGRAINE WITHOUT AURA AND WITHOUT STATUS MIGRAINOSUS: Primary | ICD-10-CM

## 2023-03-20 DIAGNOSIS — R68.84 JAW PAIN: ICD-10-CM

## 2023-03-20 PROCEDURE — 99214 OFFICE O/P EST MOD 30 MIN: CPT | Performed by: PSYCHIATRY & NEUROLOGY

## 2023-03-20 RX ORDER — FREMANEZUMAB-VFRM 225 MG/1.5ML
1.5 INJECTION SUBCUTANEOUS
COMMUNITY
End: 2023-03-24

## 2023-03-20 RX ORDER — CYCLOBENZAPRINE HCL 5 MG
7.5 TABLET ORAL NIGHTLY
Qty: 45 TABLET | Refills: 1 | Status: SHIPPED | OUTPATIENT
Start: 2023-03-20 | End: 2023-04-19

## 2023-03-20 NOTE — PROGRESS NOTES
Subjective:    CC: Moira Cardoso is in clinic today for follow up for history of migraines and new complaint of bilateral jaw/TMJ area pain.    HPI:  9/19/2019: She is in clinic for regular follow-up.  Since her last visit, she has been taking Ajovy every month.  She is now done about 5 Ajovy injections.  She reports that the effect with Ajovy is as not as good as what it was with Aimovig.  With Aimovig, she had complete resolution of headaches but with Ajovy, she is getting 3-4 breakthrough headaches in a month.  Still better than more than 15 headaches that she was experiencing prior to starting CGRP blockers.  She takes rizatriptan or ibuprofen as needed as an abortive therapy and it works really well.  She denies any side effects with Ajovy use.  She used Aimovig for 6 months prior to Ajovy and then insurance denied it.    9/9/2020: She is in clinic for regular follow-up.  Since her last visit 1 year ago, she reports that in the last 3 to 4 months, migraine intensity and frequency is slightly worse.  She is now experiencing 5-6 breakthrough migraines increase from 2-3 breakthrough migraines.  She is on Ajovy monthly injections and has done it for 1 year now.  Prior to Ajovy, she was on Aimovig and it had helped her significantly in controlling migraines but unfortunately insurance denied it.  She currently takes Maxalt as needed as an abortive treatment and does report side effects with Maxalt use.    1/13/2021: She is in clinic for regular follow-up.  Since her last visit in September, insurance did not approve Emgality.  She did try Ajovy prior to trying Emgality for about 6 months but it did not really help.  She had an excellent response with Aimovig monthly injection.  In the past, she has tried and failed Topamax and propranolol.    11/9/2021: She is in clinic for regular follow-up.  Since her last visit in January 2021, Aimovig is now approved by insurance and she has been taking it every month.  She  reports that she has had excellent response with Aimovig and has had almost complete resolution of migraines.  Whenever she gets breakthrough migraine, she will use rizatriptan at the onset and it does work as an abortive treatment.    6/20/2022: She is in clinic for regular follow-up.  Since her last visit in November 2021, she reports that while he was reevaluated excellent control.  She was on Aimovig which was switched to Ajovy due to insurance.  Initially with Ajovy, migraines were little worse but with continued use of Ajovy, they have become better.  She reports that she has been migraine free for last several months and did not have to take Maxalt.  She is tolerating Ajovy well without any side effects as well.    3/20/2023: She is in clinic for regular follow-up.  Since her last visit in June 2022, she has been doing Ajovy once a month injection with excellent response.  She reports that she has had only few migraines since her last visit.  She uses Maxalt with naproxen and this combination works really well as an abortive treatment.  She is now postmenopausal and is wondering if stopping Ajovy would be a good idea and see if menopause will reduce intensity and frequency of migraines naturally.  In addition to this, she reports that she continues to have this bilateral jaw area tenderness and mild intensity pain throughout the day.  She wakes up with it and goes to bed with it.  On the last visit, she had complained of bilateral ear pain for which I had to send her to ENT and detailed ENT evaluation did not reveal any abnormalities but now this pain is shifted to jaw area.    The following portions of the patient's history were reviewed and updated as of 03/20/2023: allergies, social history and problem list.       Current Outpatient Medications:   •  Fremanezumab-vfrm (Ajovy) 225 MG/1.5ML solution auto-injector, Inject 225 mg under the skin into the appropriate area as directed Every 30 (Thirty) Days.,  "Disp: , Rfl:   •  rizatriptan (MAXALT) 10 MG tablet, Take 1 tablet by mouth 1 (One) Time As Needed for Migraine for up to 30 doses. May repeat in 2 hours if needed, Disp: 10 tablet, Rfl: 6  •  cyclobenzaprine (FLEXERIL) 5 MG tablet, Take 1.5 tablets by mouth Every Night for 30 days., Disp: 45 tablet, Rfl: 1   Past Medical History:   Diagnosis Date   • Migraine       Past Surgical History:   Procedure Laterality Date   •  SECTION     • DENTAL PROCEDURE        Family History   Problem Relation Age of Onset   • Mental illness Mother    • Alzheimer's disease Maternal Grandmother    • Parkinsonism Paternal Grandfather    • Breast cancer Neg Hx    • Ovarian cancer Neg Hx         Review of Systems  Objective:    /86   Pulse 89   Ht 165.1 cm (65\")   Wt 104 kg (230 lb)   LMP 2021   SpO2 96%   BMI 38.27 kg/m²     Neurology Exam:  General apperance: NAD.     Mental status: Alert, awake and oriented to time place and person.    Recent and Remote memory: Can recall 3/3 objects at 5 minutes. Can recall historical events.     Attention span and Concentration: Serial 7s: Normal.     Fund of knowledge:  Normal.     Language and Speech: No aphasia or dysarthria.    Naming , Repitition and Comprehension:  Can name objects, repeat a sentence and follow commands. Speech is clear and fluent with good repetition, comprehension, and naming.    CN II to XII: Intact.    Opthalmoscopic Exam: No papilledema.    Motor:  Right UE muscle strength 5/5. Normal tone.     Left UE muscle strength 5/5. Normal tone.      Right LE muscle strength5/5. Normal tone.     Left LE muscle strength 5/5. Normal tone.      Sensory: Normal light touch, vibration and pinprick sensation bilaterally.    DTRs: 2+ bilaterally.    Babinski: Negative bilaterally.    Co-ordination: Normal finger-to-nose, heel to shin B/L.    Rhomberg: Negative.    Gait: Normal.    Cardiovascular: Regular rate and rhythm without murmur, gallop or rub.    Assessment " and Plan:  1. Intractable migraine without aura and without status migrainosus  2. B/L Jaw pain    -Migraines remain under excellent control with use of Ajovy.  She is now postmenopausal and is interested in trying to go off of Ajovy and see what happens to migraine.  She is due for her next injection on February 27.  I have advised her to not do that injection and see what happens.  If migraines return then plan would be to restart Ajovy.  -She is reporting bilateral jaw/TMJ area tenderness, mild to moderate intensity pain.  This looks more muscular in nature and it could be related to clenching/grinding of teeth.  I am going to give her a trial of Flexeril 5 mg at bedtime and see how she does.  Based on the response, further treatment options will be discussed with her.  I will plan to see her back in clinic in 3 months for follow-up.    I spent 30 minutes in patient care: Reviewing records prior to the visit, entering orders and documentation and spent more than torres 50% of this time face-to-face in management, instructions and education regarding above mentioned diagnosis and also on counseling and discussing about taking medication regularly, possible side effects with medication use, importance of good sleep hygiene, good hydration and regular exercise.    Return in about 3 months (around 6/20/2023).       Note to patient: The 21st Century Cures Act makes medical notes like these available to patients in the interest of transparency. However, be advised this is a medical document. It is intended as peer to peer communication. It is written in medical language and may contain abbreviations or verbiage that are unfamiliar. It may appear blunt or direct. Medical documents are intended to carry relevant information, facts as evident, and the clinical opinion of the physician.

## 2023-03-24 RX ORDER — FREMANEZUMAB-VFRM 225 MG/1.5ML
INJECTION SUBCUTANEOUS
Qty: 1.5 ML | Refills: 11 | Status: SHIPPED | OUTPATIENT
Start: 2023-03-24

## 2023-03-24 NOTE — TELEPHONE ENCOUNTER
Rx Refill Note  Requested Prescriptions     Pending Prescriptions Disp Refills   • Ajovy 225 MG/1.5ML solution auto-injector [Pharmacy Med Name: AJOVY 225 MG/1.5 ML AUTOINJECT] 1.5 mL      Sig: INJECT 1.5 ML UNDER THE SKIN INTO THE APPROPRIATE AREA EVERY THIRTY DAYS      Last filled:03/10/2022 with 11 refills. Sent  Last office visit with prescribing clinician: 3/20/2023      Next office visit with prescribing clinician: 6/29/2023     Natanael Cuello MA  03/24/23, 08:26 EDT

## 2023-03-27 DIAGNOSIS — G43.019 INTRACTABLE MIGRAINE WITHOUT AURA AND WITHOUT STATUS MIGRAINOSUS: ICD-10-CM

## 2023-03-27 RX ORDER — RIZATRIPTAN BENZOATE 10 MG/1
TABLET ORAL
Qty: 10 TABLET | Refills: 6 | Status: SHIPPED | OUTPATIENT
Start: 2023-03-27

## 2023-03-27 NOTE — TELEPHONE ENCOUNTER
Rx Refill Note  Requested Prescriptions     Pending Prescriptions Disp Refills   • rizatriptan (MAXALT) 10 MG tablet [Pharmacy Med Name: RIZATRIPTAN 10 MG TABLET] 10 tablet 6     Sig: TAKE ONE TABLET BY MOUTH AS ONE DOSE AS NEEDED FOR MIGRAINE FOR UP TO 3 DOSES PER DAY; MAY REPEAT IN 2 HOURS AS NEEDED      Last filled:11/9/21. Sent  Last office visit with prescribing clinician: 3/20/2023      Next office visit with prescribing clinician: 6/29/2023     Natanael Cuello MA  03/27/23, 10:37 EDT

## 2023-04-03 NOTE — TELEPHONE ENCOUNTER
Called and LVM. Relayed Dr. Reed's questions about pain in ear. Does it come and go and does it come with migraine?   Immediate Brief Procedure Note    Patient: Katharina Gil    Pre-op Diagnosis: left hip pain    Post-op Diagnosis: Same    Procedure: FL left hip injection    Proceduralist: Irineo Viramontes DO    Anesthesia Type: local    Findings: successful left hip injection    Estimated Blood Loss: <5 cc    Complications: None    Specimens Removed: None

## 2023-04-29 ENCOUNTER — HOSPITAL ENCOUNTER (OUTPATIENT)
Dept: MAMMOGRAPHY | Facility: HOSPITAL | Age: 48
Discharge: HOME | End: 2023-04-29
Admitting: OBSTETRICS & GYNECOLOGY
Payer: COMMERCIAL

## 2023-04-29 DIAGNOSIS — Z12.39 ENCOUNTER FOR BREAST CANCER SCREENING USING NON-MAMMOGRAM MODALITY: ICD-10-CM

## 2023-04-29 PROCEDURE — 77067 SCR MAMMO BI INCL CAD: CPT

## 2023-04-29 PROCEDURE — 77063 BREAST TOMOSYNTHESIS BI: CPT

## 2023-08-10 ENCOUNTER — OFFICE VISIT (OUTPATIENT)
Dept: OBSTETRICS AND GYNECOLOGY | Facility: CLINIC | Age: 48
End: 2023-08-10
Payer: COMMERCIAL

## 2023-08-10 VITALS
HEIGHT: 65 IN | BODY MASS INDEX: 40.98 KG/M2 | SYSTOLIC BLOOD PRESSURE: 128 MMHG | WEIGHT: 246 LBS | DIASTOLIC BLOOD PRESSURE: 82 MMHG

## 2023-08-10 DIAGNOSIS — E66.01 MORBID OBESITY WITH BMI OF 40.0-44.9, ADULT: ICD-10-CM

## 2023-08-10 DIAGNOSIS — R11.0 NAUSEA WITHOUT VOMITING: ICD-10-CM

## 2023-08-10 DIAGNOSIS — Z01.419 WOMEN'S ANNUAL ROUTINE GYNECOLOGICAL EXAMINATION: Primary | ICD-10-CM

## 2023-08-10 DIAGNOSIS — N95.1 MENOPAUSAL SYMPTOMS: ICD-10-CM

## 2023-08-10 DIAGNOSIS — Z12.39 ENCOUNTER FOR BREAST CANCER SCREENING USING NON-MAMMOGRAM MODALITY: ICD-10-CM

## 2023-08-10 RX ORDER — PANTOPRAZOLE SODIUM 20 MG/1
20 TABLET, DELAYED RELEASE ORAL DAILY
Qty: 30 TABLET | Refills: 11 | Status: SHIPPED | OUTPATIENT
Start: 2023-08-10 | End: 2024-08-09

## 2023-08-10 NOTE — PROGRESS NOTES
"     Gynecologic Annual Exam Note        GYN Annual Exam     CC - Here for annual exam.        HPI  Moira Cardoso is a 48 y.o. female, , who presents for annual well woman exam as a established patient. She is postmenopausal. Denies vaginal bleeding. Patient reports problems with:  hot flashes, night sweats, nausea, and pelvic pain. Patient states that hot flashes and night sweats occur every couple of days. Patient states that nausea is intermittent and occurs before she eats in the AM. Patient states that pelvic pain is intermittent and occurs a couple of times per month. Patient describes pelvic pain as \"mild twinges\". Patient states that her pain occurs in both the left and right lower quadrant. Patient with h/o endometriosis diagnosed during her c/s . There were no changes to her medical or surgical history since her last visit.. Partner Status: Marital Status: . She is is sexually active. She has not had new partners. STD testing recommendations have been explained to the patient and she does not desire STD testing.    Additional OB/GYN History   On HRT? No    Last Pap : 2022. Results: negative. HPV: negative.   Last Completed Pap Smear            PAP SMEAR (Every 3 Years) Next due on 8/3/2025      2022  LIQUID-BASED PAP SMEAR, [facility] ([facility])    2020  Done - negative- HPV non 16/18 negative                  History of abnormal Pap smear: yes - 30+ years ago- repeats normal per patient  Family history of uterine, colon, breast, or ovarian cancer: no  Performs monthly Self-Breast Exam: no  Last mammogram: 2023. Done at .  Last Completed Mammogram            Ordered - MAMMOGRAM (Yearly) Ordered on 8/10/2023      2023  Mammo Screening Digital Tomosynthesis Bilateral With CAD    2022  Mammo Screening Digital Tomosynthesis Bilateral With CAD    2021  Mammo Screening Digital Tomosynthesis Bilateral With CAD    2020  Mammo Screening Digital " Tomosynthesis Bilateral With CAD    2018  Mammo Screening Digital Tomosynthesis Bilateral With CAD    Only the first 5 history entries have been loaded, but more history exists.                  Last colonoscopy: has had a colonoscopy 2 year(s) ago.  Last Completed Colonoscopy            COLORECTAL CANCER SCREENING (COLONOSCOPY - Every 10 Years) Next due on 9/15/2031      09/15/2021  SCANNED - COLONOSCOPY                      Last bone density scan (DEXA): None  Exercises Regularly: no  Feelings of Anxiety or Depression: no      Tobacco Usage?: No       Current Outpatient Medications:     Ajovy 225 MG/1.5ML solution auto-injector, INJECT 1.5 ML UNDER THE SKIN INTO THE APPROPRIATE AREA EVERY THIRTY DAYS, Disp: 1.5 mL, Rfl: 11    rizatriptan (MAXALT) 10 MG tablet, TAKE ONE TABLET BY MOUTH AS ONE DOSE AS NEEDED FOR MIGRAINE FOR UP TO 3 DOSES PER DAY; MAY REPEAT IN 2 HOURS AS NEEDED, Disp: 10 tablet, Rfl: 6    pantoprazole (Protonix) 20 MG EC tablet, Take 1 tablet by mouth Daily., Disp: 30 tablet, Rfl: 11    Patient denies the need for medication refills today.    OB History          1    Para   1    Term   1            AB        Living   1         SAB        IAB        Ectopic        Molar        Multiple        Live Births   1                Past Medical History:   Diagnosis Date    Abnormal Pap smear of cervix     30+ years ago    Endometriosis     diagnosed during c/s    Migraine         Past Surgical History:   Procedure Laterality Date     SECTION      DENTAL PROCEDURE         Health Maintenance   Topic Date Due    TDAP/TD VACCINES (1 - Tdap) Never done    HEPATITIS C SCREENING  Never done    ANNUAL PHYSICAL  Never done    COVID-19 Vaccine (3 - Pfizer series) 2021    ZOSTER VACCINE (1 of 2) Never done    INFLUENZA VACCINE  10/01/2023    MAMMOGRAM  2024    Annual Gynecologic Pelvic and Breast Exam  2024    PAP SMEAR  2025    COLORECTAL CANCER SCREENING   "09/15/2031    Pneumococcal Vaccine 0-64  Aged Out       The additional following portions of the patient's history were reviewed and updated as appropriate: allergies, current medications, past family history, past medical history, past social history, past surgical history, and problem list.    Review of Systems   Constitutional: Negative.    HENT: Negative.     Eyes: Negative.    Respiratory: Negative.     Cardiovascular: Negative.    Gastrointestinal:  Positive for nausea.   Endocrine: Positive for heat intolerance (Hot flashes and Night sweats).   Genitourinary:  Positive for pelvic pain.   Musculoskeletal: Negative.    Skin: Negative.    Allergic/Immunologic: Negative.    Neurological: Negative.    Hematological: Negative.    Psychiatric/Behavioral: Negative.       I have reviewed and agree with the HPI, ROS, and historical information as entered above. Gisel Bonilla MD      Objective   /82   Ht 165.1 cm (65\")   Wt 112 kg (246 lb)   LMP 01/06/2021   BMI 40.94 kg/m²     Physical Exam  Vitals and nursing note reviewed. Exam conducted with a chaperone present.   Constitutional:       Appearance: She is well-developed.   HENT:      Head: Normocephalic and atraumatic.   Neck:      Thyroid: No thyroid mass or thyromegaly.   Cardiovascular:      Rate and Rhythm: Normal rate and regular rhythm.      Heart sounds: No murmur heard.  Pulmonary:      Effort: Pulmonary effort is normal. No retractions.      Breath sounds: Normal breath sounds. No wheezing, rhonchi or rales.   Chest:      Chest wall: No mass or tenderness.   Breasts:     Right: Normal. No mass, nipple discharge, skin change or tenderness.      Left: Normal. No mass, nipple discharge, skin change or tenderness.   Abdominal:      General: Bowel sounds are normal.      Palpations: Abdomen is soft. Abdomen is not rigid. There is no mass.      Tenderness: There is no abdominal tenderness. There is no guarding.      Hernia: No hernia is present. There " is no hernia in the left inguinal area.   Genitourinary:     Labia:         Right: No rash, tenderness or lesion.         Left: No rash, tenderness or lesion.       Vagina: Normal. No vaginal discharge or lesions.      Cervix: No cervical motion tenderness, discharge, lesion or cervical bleeding.      Uterus: Normal. Not enlarged, not fixed and not tender.       Adnexa:         Right: No mass or tenderness.          Left: No mass or tenderness.        Rectum: No external hemorrhoid.   Musculoskeletal:      Cervical back: Normal range of motion. No muscular tenderness.   Neurological:      Mental Status: She is alert and oriented to person, place, and time.   Psychiatric:         Behavior: Behavior normal.          Assessment and Plan    Problem List Items Addressed This Visit    None  Visit Diagnoses       Women's annual routine gynecological examination    -  Primary    Relevant Orders    CBC (No Diff)    Comprehensive Metabolic Panel    Hemoglobin A1c    Hepatitis C Antibody    T4, Free    TSH    Vitamin D,25-Hydroxy    Estradiol    Follicle Stimulating Hormone    Encounter for breast cancer screening using non-mammogram modality        Relevant Orders    Mammo Screening Digital Tomosynthesis Bilateral With CAD    Menopausal symptoms        Relevant Orders    CBC (No Diff)    Comprehensive Metabolic Panel    Hemoglobin A1c    Hepatitis C Antibody    T4, Free    TSH    Vitamin D,25-Hydroxy    Estradiol    Follicle Stimulating Hormone    Nausea without vomiting        Relevant Orders    CBC (No Diff)    Comprehensive Metabolic Panel    Hemoglobin A1c    Hepatitis C Antibody    T4, Free    TSH    Vitamin D,25-Hydroxy    Estradiol    Follicle Stimulating Hormone    Morbid obesity with BMI of 40.0-44.9, adult                 Recommended use of Vitamin D replacement and getting adequate calcium in her diet. (1500mg)  Reviewed monthly self breast exams.  Instructed to call with lumps, pain, or breast discharge.     Continue yearly mammography  Reviewed HPV guidelines.  Reviewed exercise as a preventative health measures.   BMI 40 - will refer for nutritional counseling/weight management.  Nausea - encouraged to see GI and consider endoscopy.  Menopausal guidance.  She does not feel she need intervention at this time  9.   Return in about 1 year (around 8/10/2024), or if symptoms worsen or fail to improve.     Gisel Bonilla MD  08/10/2023

## 2023-08-11 LAB
25(OH)D3+25(OH)D2 SERPL-MCNC: 35.6 NG/ML (ref 30–100)
ALBUMIN SERPL-MCNC: 4 G/DL (ref 3.5–5.2)
ALBUMIN/GLOB SERPL: 1.7 G/DL
ALP SERPL-CCNC: 82 U/L (ref 39–117)
ALT SERPL-CCNC: 38 U/L (ref 1–33)
AST SERPL-CCNC: 22 U/L (ref 1–32)
BILIRUB SERPL-MCNC: 0.3 MG/DL (ref 0–1.2)
BUN SERPL-MCNC: 10 MG/DL (ref 6–20)
BUN/CREAT SERPL: 12.8 (ref 7–25)
CALCIUM SERPL-MCNC: 9.5 MG/DL (ref 8.6–10.5)
CHLORIDE SERPL-SCNC: 105 MMOL/L (ref 98–107)
CO2 SERPL-SCNC: 25.9 MMOL/L (ref 22–29)
CREAT SERPL-MCNC: 0.78 MG/DL (ref 0.57–1)
EGFRCR SERPLBLD CKD-EPI 2021: 92.7 ML/MIN/1.73
ERYTHROCYTE [DISTWIDTH] IN BLOOD BY AUTOMATED COUNT: 12.3 % (ref 12.3–15.4)
ESTRADIOL SERPL-MCNC: 78.3 PG/ML
FSH SERPL-ACNC: 45 MIU/ML
GLOBULIN SER CALC-MCNC: 2.4 GM/DL
GLUCOSE SERPL-MCNC: 105 MG/DL (ref 65–99)
HBA1C MFR BLD: 5.9 % (ref 4.8–5.6)
HCT VFR BLD AUTO: 38.3 % (ref 34–46.6)
HCV IGG SERPL QL IA: NON REACTIVE
HGB BLD-MCNC: 13.1 G/DL (ref 12–15.9)
MCH RBC QN AUTO: 30.9 PG (ref 26.6–33)
MCHC RBC AUTO-ENTMCNC: 34.2 G/DL (ref 31.5–35.7)
MCV RBC AUTO: 90.3 FL (ref 79–97)
PLATELET # BLD AUTO: 257 10*3/MM3 (ref 140–450)
POTASSIUM SERPL-SCNC: 4.3 MMOL/L (ref 3.5–5.2)
PROT SERPL-MCNC: 6.4 G/DL (ref 6–8.5)
RBC # BLD AUTO: 4.24 10*6/MM3 (ref 3.77–5.28)
SODIUM SERPL-SCNC: 142 MMOL/L (ref 136–145)
T4 FREE SERPL-MCNC: 1.16 NG/DL (ref 0.93–1.7)
TSH SERPL DL<=0.005 MIU/L-ACNC: 1.73 UIU/ML (ref 0.27–4.2)
WBC # BLD AUTO: 5.64 10*3/MM3 (ref 3.4–10.8)

## 2023-08-24 NOTE — PROGRESS NOTES
A1C is elevated, mild elevation in ALT, and labs not fully consistent with menopause yet.  Needs fasting lipid profile if not done recently.  We discussed weight loss and GLP 1 agonists at her visit.

## 2023-08-25 DIAGNOSIS — R73.09 ELEVATED HEMOGLOBIN A1C: Primary | ICD-10-CM

## 2023-08-25 DIAGNOSIS — Z01.419 WOMEN'S ANNUAL ROUTINE GYNECOLOGICAL EXAMINATION: ICD-10-CM

## 2023-09-06 ENCOUNTER — TELEPHONE (OUTPATIENT)
Dept: NEUROLOGY | Facility: CLINIC | Age: 48
End: 2023-09-06
Payer: COMMERCIAL

## 2023-09-06 NOTE — TELEPHONE ENCOUNTER
Caller: Moira Cardoso    Relationship: Self    Best call back number: [phone]     What is the best time to reach you: ANY    Who are you requesting to speak with (clinical staff, provider,  specific staff member): PROVIDER    What was the call regarding: PATIENT ADVISES HER RX FOR AJOVY IS TOO EXPENSIVE. SHE WOULD LIKE TO SPEAK TO MD RE: OTHER DISCOUNTS FOR AJOVY OR AN ALTERNATIVE/CHEAPER MEDICATION.    PLEASE CALL & ADVISE-THANK YOU

## 2023-09-07 ENCOUNTER — SPECIALTY PHARMACY (OUTPATIENT)
Dept: NEUROLOGY | Facility: CLINIC | Age: 48
End: 2023-09-07
Payer: COMMERCIAL

## 2023-09-07 NOTE — TELEPHONE ENCOUNTER
Outgoing call to Moira    [facility] staff had already contacted her-- she has a video visit set up for Monday and will discuss Nurtec with them at that time.      Jose TAYLOR

## 2023-09-11 ENCOUNTER — SPECIALTY PHARMACY (OUTPATIENT)
Dept: ONCOLOGY | Facility: HOSPITAL | Age: 48
End: 2023-09-11
Payer: COMMERCIAL

## 2023-09-11 PROBLEM — G43.019 INTRACTABLE MIGRAINE WITHOUT AURA AND WITHOUT STATUS MIGRAINOSUS: Status: ACTIVE | Noted: 2023-09-11

## 2023-09-11 RX ORDER — RIMEGEPANT SULFATE 75 MG/75MG
75 TABLET, ORALLY DISINTEGRATING ORAL EVERY OTHER DAY
Qty: 16 TABLET | Refills: 11 | Status: SHIPPED | OUTPATIENT
Start: 2023-09-11

## 2023-09-11 NOTE — PROGRESS NOTES
Specialty Pharmacy Patient Management Program  Neurology Initial Assessment     Moira Cardoso is a 48 y.o. female with migraines seen by a Neurology provider and enrolled in the Neurology Patient Management program offered by [facility].  An initial outreach was conducted, including assessment of therapy appropriateness and specialty medication education for Nurtec every other day. The patient was introduced to services offered by [facility], including: regular assessments, refill coordination, curbside pick-up or mail order delivery options, prior authorization maintenance, and financial assistance programs as applicable. The patient was also provided with contact information for the pharmacy team.     Insurance Coverage & Financial Support  [employer]  Nurtec co-pay card    Relevant Past Medical History and Comorbidities  Relevant medical history and concomitant health conditions were discussed with the patient. The patient's chart has been reviewed for relevant past medical history and comorbid health conditions and updated as necessary.   Past Medical History:   Diagnosis Date    Abnormal Pap smear of cervix     30+ years ago    Endometriosis     diagnosed during c/s    Migraine      Social History     Socioeconomic History    Marital status:    Tobacco Use    Smoking status: Never    Smokeless tobacco: Never   Vaping Use    Vaping Use: Never used   Substance and Sexual Activity    Alcohol use: Yes     Comment: occasionally    Drug use: No    Sexual activity: Yes     Partners: Male     Birth control/protection: Post-menopausal     Problem list reviewed by Radha Cho, PharmD on 9/11/2023 at  9:07 AM    Allergies  Known allergies and reactions were discussed with the patient. The patient's chart has been reviewed for  allergy information and updated as necessary.   Allergies   Allergen Reactions    Penicillins Hives     Allergies reviewed by Marquis  Radha MELISSA PharmD on 9/11/2023 at  9:03 AM    Relevant Laboratory Values  Common labs          8/10/2023    10:35   Common Labs   Glucose 105    BUN 10    Creatinine 0.78    Sodium 142    Potassium 4.3    Chloride 105    Calcium 9.5    Total Protein 6.4    Albumin 4.0    Total Bilirubin 0.3    Alkaline Phosphatase 82    AST (SGOT) 22    ALT (SGPT) 38    WBC 5.64    Hemoglobin 13.1    Hematocrit 38.3    Platelets 257    Hemoglobin A1C 5.90        Current Medication List  This medication list has been reviewed with the patient and evaluated for any interactions or necessary modifications/recommendations, and updated to include all prescription medications, OTC medications, and supplements the patient is currently taking.  This list reflects what is contained in the patient's profile, which has also been marked as reviewed to communicate to other providers it is the most up to date version of the patient's current medication therapy.     Current Outpatient Medications:     pantoprazole (Protonix) 20 MG EC tablet, Take 1 tablet by mouth Daily., Disp: 30 tablet, Rfl: 11    Rimegepant Sulfate (Nurtec) 75 MG tablet dispersible tablet, Take 1 tablet by mouth Every Other Day., Disp: 16 tablet, Rfl: 11    rizatriptan (MAXALT) 10 MG tablet, TAKE ONE TABLET BY MOUTH AS ONE DOSE AS NEEDED FOR MIGRAINE FOR UP TO 3 DOSES PER DAY; MAY REPEAT IN 2 HOURS AS NEEDED, Disp: 10 tablet, Rfl: 6    Medicines reviewed by Radha Cho, PharmD on 9/11/2023 at  9:03 AM    Drug Interactions  none     Initial Education Provided for Specialty Medication  The patient has been provided with the following education and any applicable administration techniques (i.e. self-injection) have been demonstrated for the therapies indicated. All questions and concerns have been addressed prior to the patient receiving the medication, and the patient has verbalized understanding of the education and any materials provided.  Additional patient education  shall be provided and documented upon request by the patient, provider or payer.        Nurtec (rimegepant)  Medication Expectations   Why am I taking this medication? You are taking this medication for migraine prophylaxis or to treat an acute migraine.   What should I expect while on this medication? You should expect to see a decrease in the frequency and severity of your migraines.   How does the medication work? Nurtec is a monoclonal antibody that binds to calcitonin gene-related peptide (CGRP) and blocks its binding to the receptor decreasing the severity of migraines.   How long will I be on this medication for? The amount of time you will be on this medication will be determined by your doctor and your response to the medication.    How do I take this medication? Take as directed on your prescription label.   What are some possible side effects? Potential side effects including, but not limited to nausea. Pt verbalized understanding.   What happens if I miss a dose? Take the missed dose as soon as possible, and resume the every other day timed from the last dose..     Medication Safety   What are things I should warn my doctor immediately about? Hypersensitivity reactions - trouble breathing or swallowing.   What are things that I should be cautious of? Hypersensitivity reactions (eg, dyspnea, rash), including delayed serious reactions, have occurred; discontinue use if suspected    What are some medications that can interact with this one? Avoid concomitant administration of Nurtec ODT with strong inhibitors of CY, strong or moderate inducers of CYP3A or inhibitors of P-gp or BCRP. Avoid another dose of Nurtec ODT within 48 hours when it is administered with moderate inhibitors of CY.  Ask your pharmacist or health care provider before starting new medications     Medication Storage/Handling   How should I handle this medication? Keep this medication out of reach of pets/children in original  container. Ensure hands are dry before opening blister pack.   How does this medication need to be stored? Store at room temperature away from heat/cold, sunlight or moisture   How should I dispose of this medication? There should not be a need to dispose of this medication unless your provider decides to change the dose or therapy. If that is the case, take to your local police station for proper disposal. Some pharmacies also have take-back bins for medication drop-off.      Resources/Support   How can I remind myself to take this medication? You can download reminder apps to help you manage your refills. You may also set an alarm on your phone to remind you. The pharmacy carries pill boxes that you can place next to an area you pass everyday (such as where you place your car keys or where you charge your phone)   Is financial support available?  Yes, [employer] can provide co-pay cards if you have commercial insurance or patient assistance if you have Medicare or no insurance.    Which vaccines are recommended for me? Talk to your doctor about these vaccines: Flu, Coronavirus (COVID-19), Pneumococcal (pneumonia), Tdap, Hepatitis B, Zoster (shingles)          Adherence and Self-Administration  Adherence related to the patient's specialty therapy was discussed with the patient. The Adherence segment of this outreach has been reviewed and updated.   Is there a concern with patient's ability to self administer the medication correctly and without issue?: No  Were any potential barriers to adherence identified during the initial assessment or patient education?: No  Are there any concerns regarding the patient's understanding of the importance of medication adherence?: No  Methods for Supporting Patient Adherence and/or Self-Administration: none    Goals of Therapy  Goals related to the patient's specialty therapy were discussed with the patient. The Patient Goals segment of this outreach has been  reviewed and updated.   Goals Addressed Today        Specialty Pharmacy General Goal      Decrease frequency, severity and duration of migraine attacks.                 Reassessment Plan & Follow-Up  Medication Therapy Changes: Nurtec 75 mg PO every other day  Related Plans, Therapy Recommendations, or Therapy Problems to Be Addressed: none  Pharmacist to perform regular reassessments no more than (6) months from the previous assessment.  Care Coordinator to set up future refill outreaches, coordinate prescription delivery, and escalate clinical questions to pharmacist.   Welcome information and patient satisfaction survey to be sent by specialty pharmacy team with patient's initial fill.    Attestation  Therapeutic appropriateness: Appropriate   I attest the patient was actively involved in and has agreed to the above plan of care. If the prescribed therapy is at any point deemed not appropriate based on the current or future assessments, a consultation will be initiated with the patient's specialty care provider to determine the best course of action. The revised plan of therapy will be documented along with any additional patient education provided. Discussed aforementioned material with patient via telemedicine.    Radha Cho PharmD, [facility]  Clinic Specialty Pharmacist, Neurology  9/11/2023  09:10 EDT

## 2023-10-05 ENCOUNTER — SPECIALTY PHARMACY (OUTPATIENT)
Dept: NEUROLOGY | Facility: CLINIC | Age: 48
End: 2023-10-05
Payer: COMMERCIAL

## 2023-10-05 NOTE — PROGRESS NOTES
Specialty Pharmacy Refill Coordination Note     Moira is a 48 y.o. female contacted today regarding refills of  Nurtec specialty medication(s).    Reviewed and verified with patient: YES  Specialty medication(s) and dose(s) confirmed: yes    Refill Questions      Flowsheet Row Most Recent Value   Changes to allergies? No   Changes to medications? No   New conditions since last clinic visit No   Unplanned office visit, urgent care, ED, or hospital admission in the last 4 weeks  No   How does patient/caregiver feel medication is working? Very good   Financial problems or insurance changes  No   Since the previous refill, were any specialty medication doses or scheduled injections missed or delayed?  No   Does this patient require a clinical escalation to a pharmacist? No            Delivery Questions      Flowsheet Row Most Recent Value   Delivery method [employer]   Delivery address correct? Yes   Preferred delivery time? Anytime   Number of medications in delivery 1   Medication being filled and delivered nurtec   Doses left of specialty medications 4   Is there any medication that is due not being filled? No   Supplies needed? No supplies needed   Cooler needed? No   Do any medications need mixed or dated? No   [employer] form of payment Payment plan already set up   Questions or concerns for the pharmacist? No   Are any medications first time fills? No                   Follow-up: 30 day(s)     Christine Haas, Pharmacy Technician  Specialty Pharmacy Technician

## 2023-10-20 ENCOUNTER — OFFICE VISIT (OUTPATIENT)
Dept: NEUROLOGY | Facility: CLINIC | Age: 48
End: 2023-10-20
Payer: COMMERCIAL

## 2023-10-20 VITALS
BODY MASS INDEX: 41.14 KG/M2 | OXYGEN SATURATION: 98 % | DIASTOLIC BLOOD PRESSURE: 88 MMHG | HEIGHT: 65 IN | SYSTOLIC BLOOD PRESSURE: 138 MMHG | WEIGHT: 246.91 LBS | HEART RATE: 86 BPM

## 2023-10-20 DIAGNOSIS — G43.019 INTRACTABLE MIGRAINE WITHOUT AURA AND WITHOUT STATUS MIGRAINOSUS: Primary | ICD-10-CM

## 2023-10-20 DIAGNOSIS — R68.84 JAW PAIN: ICD-10-CM

## 2023-10-20 PROCEDURE — 99214 OFFICE O/P EST MOD 30 MIN: CPT | Performed by: PSYCHIATRY & NEUROLOGY

## 2023-10-20 RX ORDER — CHLORAL HYDRATE 500 MG
CAPSULE ORAL
COMMUNITY

## 2023-10-20 RX ORDER — ERGOCALCIFEROL 1.25 MG/1
50000 CAPSULE ORAL DAILY
COMMUNITY

## 2023-10-20 NOTE — PROGRESS NOTES
Subjective:    CC: Moira Cardoso is in clinic today for follow up for history of migraines and bilateral jaw pain.    HPI:  9/19/2019: She is in clinic for regular follow-up.  Since her last visit, she has been taking Ajovy every month.  She is now done about 5 Ajovy injections.  She reports that the effect with Ajovy is as not as good as what it was with Aimovig.  With Aimovig, she had complete resolution of headaches but with Ajovy, she is getting 3-4 breakthrough headaches in a month.  Still better than more than 15 headaches that she was experiencing prior to starting CGRP blockers.  She takes rizatriptan or ibuprofen as needed as an abortive therapy and it works really well.  She denies any side effects with Ajovy use.  She used Aimovig for 6 months prior to Ajovy and then insurance denied it.    9/9/2020: She is in clinic for regular follow-up.  Since her last visit 1 year ago, she reports that in the last 3 to 4 months, migraine intensity and frequency is slightly worse.  She is now experiencing 5-6 breakthrough migraines increase from 2-3 breakthrough migraines.  She is on Ajovy monthly injections and has done it for 1 year now.  Prior to Ajovy, she was on Aimovig and it had helped her significantly in controlling migraines but unfortunately insurance denied it.  She currently takes Maxalt as needed as an abortive treatment and does report side effects with Maxalt use.    1/13/2021: She is in clinic for regular follow-up.  Since her last visit in September, insurance did not approve Emgality.  She did try Ajovy prior to trying Emgality for about 6 months but it did not really help.  She had an excellent response with Aimovig monthly injection.  In the past, she has tried and failed Topamax and propranolol.    11/9/2021: She is in clinic for regular follow-up.  Since her last visit in January 2021, Aimovig is now approved by insurance and she has been taking it every month.  She reports that she has had  excellent response with Aimovig and has had almost complete resolution of migraines.  Whenever she gets breakthrough migraine, she will use rizatriptan at the onset and it does work as an abortive treatment.    6/20/2022: She is in clinic for regular follow-up.  Since her last visit in November 2021, she reports that while he was reevaluated excellent control.  She was on Aimovig which was switched to Ajovy due to insurance.  Initially with Ajovy, migraines were little worse but with continued use of Ajovy, they have become better.  She reports that she has been migraine free for last several months and did not have to take Maxalt.  She is tolerating Ajovy well without any side effects as well.    3/20/2023: She is in clinic for regular follow-up.  Since her last visit in June 2022, she has been doing Ajovy once a month injection with excellent response.  She reports that she has had only few migraines since her last visit.  She uses Maxalt with naproxen and this combination works really well as an abortive treatment.  She is now postmenopausal and is wondering if stopping Ajovy would be a good idea and see if menopause will reduce intensity and frequency of migraines naturally.  In addition to this, she reports that she continues to have this bilateral jaw area tenderness and mild intensity pain throughout the day.  She wakes up with it and goes to bed with it.  On the last visit, she had complained of bilateral ear pain for which I had to send her to ENT and detailed ENT evaluation did not reveal any abnormalities but now this pain is shifted to jaw area.    10/20/2023: She is in clinic for regular follow-up.  Since her last visit in March 2023, due to insurance change, Ajovy had to be switched to Aimovig and then insurance denied Aimovig so she was started on Nurtec every other day about 6 to 8 weeks ago.  With Nurtec, she has not had good response.  She is reporting 8 migraine days in a month.  Maxalt is working  "well as an abortive treatment.  We will try muscle relaxer for bilateral jaw pain but it made her really sleepy.  She reports that it is worse first thing in the morning and then she does experiences throughout the day as well.  She takes ibuprofen as needed which helps but then pain returns once the effect of ibuprofen wears off.    The following portions of the patient's history were reviewed and updated as of 10/20/2023: allergies, social history, and problem list.       Current Outpatient Medications:     Omega-3 Fatty Acids (fish oil) 1000 MG capsule capsule, Take  by mouth., Disp: , Rfl:     pantoprazole (Protonix) 20 MG EC tablet, Take 1 tablet by mouth Daily., Disp: 30 tablet, Rfl: 11    rizatriptan (MAXALT) 10 MG tablet, TAKE ONE TABLET BY MOUTH AS ONE DOSE AS NEEDED FOR MIGRAINE FOR UP TO 3 DOSES PER DAY; MAY REPEAT IN 2 HOURS AS NEEDED, Disp: 10 tablet, Rfl: 6    vitamin D (ERGOCALCIFEROL) 1.25 MG (34512 UT) capsule capsule, Take 1 capsule by mouth Daily., Disp: , Rfl:    Past Medical History:   Diagnosis Date    Abnormal Pap smear of cervix     30+ years ago    Endometriosis     diagnosed during c/s    Migraine       Past Surgical History:   Procedure Laterality Date     SECTION      DENTAL PROCEDURE        Family History   Problem Relation Age of Onset    Mental illness Mother     Parkinsonism Paternal Grandfather     Alzheimer's disease Maternal Grandmother     Breast cancer Neg Hx     Ovarian cancer Neg Hx     Uterine cancer Neg Hx     Colon cancer Neg Hx         Review of Systems  Objective:    /88   Pulse 86   Ht 165.1 cm (65\")   Wt 112 kg (246 lb 14.6 oz)   LMP 2021   SpO2 98%   BMI 41.09 kg/m²     Neurology Exam:  General apperance: NAD.     Mental status: Alert, awake and oriented to time place and person.    Language and Speech: No aphasia or dysarthria.    CN II to XII: Intact.    Opthalmoscopic Exam: No papilledema.    Motor:  Right UE muscle strength 5/5. Normal " tone.     Left UE muscle strength 5/5. Normal tone.      Right LE muscle strength 5/5. Normal tone.     Left LE muscle strength 5/5. Normal tone.      Sensory: Normal light touch, vibration and pinprick sensation bilaterally.    DTRs: 2+ bilaterally.    Babinski: Negative bilaterally.    Co-ordination: Normal finger-to-nose, heel to shin B/L.    Rhomberg: Negative.    Gait: Normal.    Cardiovascular: Regular rate and rhythm without murmur, gallop or rub.    Assessment and Plan:  1. Intractable migraine without aura and without status migrainosus  2. Jaw pain  -Patient with history of migraines.  She has had excellent response to Ajovy and Aimovig in the past.  She has tried Emgality but it did not really help.  Insurance temporarily approved Ajovy and Aimovig and again denied it about couple of months ago since then she has been on Nurtec every other day which has not helped.  Today we rechecked on her insurance and insurance will cover Ajovy so Ajovy will be restarted every month and Maxalt be continued as an abortive treatment.  For bilateral jaw pain, I advised her to talk to her dentist and consider mouthguard to help with the symptoms.  If it does not help then Botox and med masseter muscles can certainly help.  I will plan to see her back in clinic in 6 months for follow-up.    I spent 30 minutes in patient care: Reviewing records prior to the visit, entering orders and documentation and spent more than torres 50% of this time face-to-face in management, instructions and education regarding above mentioned diagnosis and also on counseling and discussing about taking medication regularly, possible side effects with medication use, importance of good sleep hygiene, good hydration and regular exercise.    Return in about 6 months (around 4/20/2024).       Note to patient: The 21st Century Cures Act makes medical notes like these available to patients in the interest of transparency. However, be advised this is a  medical document. It is intended as peer to peer communication. It is written in medical language and may contain abbreviations or verbiage that are unfamiliar. It may appear blunt or direct. Medical documents are intended to carry relevant information, facts as evident, and the clinical opinion of the physician.

## 2023-10-25 ENCOUNTER — SPECIALTY PHARMACY (OUTPATIENT)
Dept: ONCOLOGY | Facility: HOSPITAL | Age: 48
End: 2023-10-25
Payer: COMMERCIAL

## 2023-10-25 RX ORDER — FREMANEZUMAB-VFRM 225 MG/1.5ML
225 INJECTION SUBCUTANEOUS
Qty: 1.5 ML | Refills: 11 | Status: SHIPPED | OUTPATIENT
Start: 2023-10-25

## 2023-10-25 NOTE — PROGRESS NOTES
Specialty Pharmacy Patient Management Program  Neurology Initial Assessment     Moira Cardoso is a 48 y.o. female with migraines seen by a Neurology provider and enrolled in the Neurology Patient Management program offered by [facility].  An initial outreach was conducted, including assessment of therapy appropriateness and specialty medication education for Ajovy. The patient was introduced to services offered by [facility], including: regular assessments, refill coordination, curbside pick-up or mail order delivery options, prior authorization maintenance, and financial assistance programs as applicable. The patient was also provided with contact information for the pharmacy team.     Insurance Coverage & Financial Support  [employer] co-pay card    Relevant Past Medical History and Comorbidities  Relevant medical history and concomitant health conditions were discussed with the patient. The patient's chart has been reviewed for relevant past medical history and comorbid health conditions and updated as necessary.   Past Medical History:   Diagnosis Date    Abnormal Pap smear of cervix     30+ years ago    Endometriosis     diagnosed during c/s    Migraine      Social History     Socioeconomic History    Marital status:    Tobacco Use    Smoking status: Never     Passive exposure: Never    Smokeless tobacco: Never   Vaping Use    Vaping Use: Never used   Substance and Sexual Activity    Alcohol use: Yes     Comment: occasionally    Drug use: No    Sexual activity: Yes     Partners: Male     Birth control/protection: Post-menopausal     Problem list reviewed by Radha Cho, PharmD on 10/25/2023 at  8:40 AM    Allergies  Known allergies and reactions were discussed with the patient. The patient's chart has been reviewed for  allergy information and updated as necessary.   Allergies   Allergen Reactions    Penicillins Hives     Allergies reviewed by  Radha Cho, PharmD on 10/25/2023 at  8:18 AM    Relevant Laboratory Values  Common labs          8/10/2023    10:35   Common Labs   Glucose 105    BUN 10    Creatinine 0.78    Sodium 142    Potassium 4.3    Chloride 105    Calcium 9.5    Total Protein 6.4    Albumin 4.0    Total Bilirubin 0.3    Alkaline Phosphatase 82    AST (SGOT) 22    ALT (SGPT) 38    WBC 5.64    Hemoglobin 13.1    Hematocrit 38.3    Platelets 257    Hemoglobin A1C 5.90        Lab Assessment  The above labs have been reviewed. No dose adjustments are needed for the specialty medication(s) based on the labs.     Current Medication List  This medication list has been reviewed with the patient and evaluated for any interactions or necessary modifications/recommendations, and updated to include all prescription medications, OTC medications, and supplements the patient is currently taking.  This list reflects what is contained in the patient's profile, which has also been marked as reviewed to communicate to other providers it is the most up to date version of the patient's current medication therapy.     Current Outpatient Medications:     Fremanezumab-vfrm (Ajovy) 225 MG/1.5ML solution auto-injector, Inject 225 mg under the skin into the appropriate area as directed Every 30 (Thirty) Days., Disp: 1.5 mL, Rfl: 11    Omega-3 Fatty Acids (fish oil) 1000 MG capsule capsule, Take  by mouth., Disp: , Rfl:     pantoprazole (Protonix) 20 MG EC tablet, Take 1 tablet by mouth Daily., Disp: 30 tablet, Rfl: 11    rizatriptan (MAXALT) 10 MG tablet, TAKE ONE TABLET BY MOUTH AS ONE DOSE AS NEEDED FOR MIGRAINE FOR UP TO 3 DOSES PER DAY; MAY REPEAT IN 2 HOURS AS NEEDED, Disp: 10 tablet, Rfl: 6    vitamin D (ERGOCALCIFEROL) 1.25 MG (56863 UT) capsule capsule, Take 1 capsule by mouth Daily., Disp: , Rfl:     Medicines reviewed by Radha Cho, PharmD on 10/25/2023 at  8:40 AM    Drug Interactions  none     Initial Education Provided for Specialty  Medication  The patient has been provided with the following education and any applicable administration techniques (i.e. self-injection) have been demonstrated for the therapies indicated. All questions and concerns have been addressed prior to the patient receiving the medication, and the patient has verbalized understanding of the education and any materials provided.  Additional patient education shall be provided and documented upon request by the patient, provider or payer.             Ajovy (fremanezumab-vfrm) 225 mg subQ every 28 days        Medication Expectations   Why am I taking this medication? You are taking this medication for migraine prophylaxis.   What should I expect while on this medication? You should expect to a decrease in the frequency and severity of your migraines.   How does the medication work? Ajovy is a monoclonal antibody that binds to calcitonin gene-related peptide (CGRP) and blocks its binding to the receptor decreasing the severity of migraines.   How long will I be on this medication for? The amount of time you will be on this medication will be determined by your doctor and your response to the medication.    How do I take this medication? Take as directed on your prescription label. This medication is a self-injection given every 28 days.    What are some possible side effects? Injection site reactions and hypersensitivity reactions.   What happens if I miss a dose? If you miss a dose, take it as soon as you remember, and time next dose 28 days from last dose.                  Medication Safety   What are things I should warn my doctor immediately about? Cases of anaphylaxis and angioedema have been reported in the postmarketing setting. If a reaction occurs, notify your doctor immediately.   What are things that I should be cautious of? Injection site reaction and hypersensitivity reactions, including rash, pruritus, drug hypersensitivity, and urticaria   What are some  medications that can interact with this one? No drug interactions identified.            Medication Storage/Handling   How should I handle this medication? Keep this medication our of reach of pets/children in original container.  On the day your Ajovy is due let it set at room temperature for 30 minutes prior to injection. (do NOT warm using a heat source such as hot water or a microwave).  Administer in the abdomen, thigh, back of the upper arm, or buttocks.  Do not inject where the skin is tender, bruised, red or hard.  Rotate injection sites.   How does this medication need to be stored? Store in refrigerator and keep dry.   How should I dispose of this medication? You can dispose of the empty syringe in a sharps container, and if this is not available you may use an empty hard plastic container such as a milk jug or tide container.            Resources/Support   How can I remind myself to take this medication? You can download a reminder zach on your phone or use a calandar  to help with your monthly injection.   Is financial support available?  Yes, [employer] can provide co-pay cards if you have commercial insurance or patient assistance if you have Medicare or no insurance.    Which vaccines are recommended for me? Talk to your doctor about these vaccines: Flu, Coronavirus (COVID-19), Pneumococcal (pneumonia), Tdap, Hepatitis B, Zoster (shingles)                   Adherence and Self-Administration  Adherence related to the patient's specialty therapy was discussed with the patient. The Adherence segment of this outreach has been reviewed and updated.   Is there a concern with patient's ability to self administer the medication correctly and without issue?: No  Were any potential barriers to adherence identified during the initial assessment or patient education?: No  Are there any concerns regarding the patient's understanding of the importance of medication adherence?: No  Methods for Supporting  Patient Adherence and/or Self-Administration: none    Goals of Therapy  Goals related to the patient's specialty therapy were discussed with the patient. The Patient Goals segment of this outreach has been reviewed and updated.   Goals Addressed Today        Specialty Pharmacy General Goal      Decrease frequency, severity and duration of migraine attacks by 50% from baseline.                   Reassessment Plan & Follow-Up  Medication Therapy Changes: D/c Nurtec QOD. Startg Ajovy 225 mg subcutaneously monthly  Related Plans, Therapy Recommendations, or Therapy Problems to Be Addressed: none  Pharmacist to perform regular reassessments no more than (6) months from the previous assessment.  Care Coordinator to set up future refill outreaches, coordinate prescription delivery, and escalate clinical questions to pharmacist.   Welcome information and patient satisfaction survey to be sent by specialty pharmacy team with patient's initial fill.    Attestation  Therapeutic appropriateness: Appropriate   I attest the patient was actively involved in and has agreed to the above plan of care. If the prescribed therapy is at any point deemed not appropriate based on the current or future assessments, a consultation will be initiated with the patient's specialty care provider to determine the best course of action. The revised plan of therapy will be documented along with any additional patient education provided. Discussed aforementioned material with patient via telemedicine.    Radha Cho, [place], [facility] Specialty Pharmacist, Neurology  10/25/2023  08:44 EDT

## 2023-11-17 ENCOUNTER — OFFICE VISIT (OUTPATIENT)
Dept: INTERNAL MEDICINE | Facility: CLINIC | Age: 48
End: 2023-11-17
Payer: COMMERCIAL

## 2023-11-17 VITALS
SYSTOLIC BLOOD PRESSURE: 132 MMHG | OXYGEN SATURATION: 96 % | BODY MASS INDEX: 41.65 KG/M2 | DIASTOLIC BLOOD PRESSURE: 86 MMHG | HEIGHT: 65 IN | TEMPERATURE: 97.8 F | HEART RATE: 83 BPM | WEIGHT: 250 LBS

## 2023-11-17 DIAGNOSIS — K21.9 GASTROESOPHAGEAL REFLUX DISEASE, UNSPECIFIED WHETHER ESOPHAGITIS PRESENT: ICD-10-CM

## 2023-11-17 DIAGNOSIS — R63.5 WEIGHT GAIN: ICD-10-CM

## 2023-11-17 DIAGNOSIS — Z76.89 ESTABLISHING CARE WITH NEW DOCTOR, ENCOUNTER FOR: Primary | ICD-10-CM

## 2023-11-17 DIAGNOSIS — E66.01 CLASS 3 SEVERE OBESITY DUE TO EXCESS CALORIES WITH SERIOUS COMORBIDITY AND BODY MASS INDEX (BMI) OF 40.0 TO 44.9 IN ADULT: ICD-10-CM

## 2023-11-17 DIAGNOSIS — G43.019 INTRACTABLE MIGRAINE WITHOUT AURA AND WITHOUT STATUS MIGRAINOSUS: ICD-10-CM

## 2023-11-17 DIAGNOSIS — Z23 NEED FOR VACCINATION: ICD-10-CM

## 2023-11-17 DIAGNOSIS — R73.09 ELEVATED HEMOGLOBIN A1C: ICD-10-CM

## 2023-11-17 DIAGNOSIS — R11.0 NAUSEA: ICD-10-CM

## 2023-11-17 DIAGNOSIS — Z97.3 WEARS GLASSES: ICD-10-CM

## 2023-11-17 DIAGNOSIS — Z71.3 ENCOUNTER FOR DIETARY COUNSELING AND SURVEILLANCE: ICD-10-CM

## 2023-11-17 DIAGNOSIS — Z13.31 DEPRESSION SCREEN: ICD-10-CM

## 2023-11-17 DIAGNOSIS — R68.84 JAW PAIN: ICD-10-CM

## 2023-11-17 DIAGNOSIS — Z78.9 NON-SMOKER: ICD-10-CM

## 2023-11-17 DIAGNOSIS — Z78.9 NO SIGNIFICANT FAMILY HISTORY: ICD-10-CM

## 2023-11-17 PROBLEM — E66.09 CLASS 2 OBESITY DUE TO EXCESS CALORIES WITHOUT SERIOUS COMORBIDITY WITH BODY MASS INDEX (BMI) OF 38.0 TO 38.9 IN ADULT: Status: RESOLVED | Noted: 2020-01-20 | Resolved: 2023-11-17

## 2023-11-17 PROBLEM — E66.812 CLASS 2 OBESITY DUE TO EXCESS CALORIES WITHOUT SERIOUS COMORBIDITY WITH BODY MASS INDEX (BMI) OF 38.0 TO 38.9 IN ADULT: Status: RESOLVED | Noted: 2020-01-20 | Resolved: 2023-11-17

## 2023-11-17 RX ORDER — MELATONIN
1000 DAILY
COMMUNITY

## 2023-11-17 NOTE — PROGRESS NOTES
Office Note     Name: Moira Cardoso    : 1975     MRN: [id number]     Chief Complaint  Establish Care (Previous PCP- Brenda Holm MD. Last OV approx 6 month ago (not happy with prior PCP)), Heartburn, Weight Loss, and Immunizations (Patient would like recommendation on TDAP versus 1st Shingles vaccine today. )    Subjective     History of Present Illness:  Moira Cardoso is a 48 y.o. female who presents today for establish care    Patient does currently see neurology for jaw pain and intractable migraine without aura and without status migrainous.  Most recent visit was .  She was having insurance issues regarding coverage of the Ajovy and Aimovig.  She was tried on Nurtec every other day but it did not help.  It looks like at previous visit they did check with her insurance and it will cover Ajovy so will be restarted.  She will continue the Maxalt for abortive therapy.  Recommended she talk to her dentist about the jaw pain for consideration of the mouthguard.  If that does not help then Botox in mid masseter muscles will be considered.  Plan to follow-up in 6 months    Patient was seen by women's health in August.  Per documentation patient's last mammogram was in 2023.  Colonoscopy was 2 years ago in 2021.  Labs were ordered at that visit and patient was notified of results.  Patient was concerned about hot flashes.  Patient did not feel warranted intervention at that time.  Discussions for both hormonal and nonhormonal was offered.  She was sent and pantoprazole for her nausea.  Plan to follow-up in a year  Patient states that the Protonix is medication she has been on before.  She did stop taking it.  She thought it was related to her hormones with having some of this sternal discomfort.  The Protonix has been happening since starting it.  However, she does continue to complain of some jaw pain around her ear that does shoot down her neck.  She has been to ENT within  normal evaluation.  She did go to the dentist and thought it could have been an on clicking TMJ.  She does wear a mouthguard.  She does not particularly feel this is helping.  Neurology tried a muscle relaxer but it has not been helping.  She is a mouth breather.  She does not sleep with a humidifier at night.  Tonsils are still present.  She is wondering if this could all be associated with her acid reflux    Colonoscopy completed 2021 with recommended 10-year repeat    Last Pap smear was completed 2022    Mammogram completed 2023    Patient is a non-smoker.  Occasional alcohol use.  No drug use    Immunizations: Flu shot was completed 2023.  Recommendations to complete COVID, shingles, tetanus.  Patient is interested in the for shingles immunization today    Patient denies any significant family history    Patient does have concerns about her weight today.  She ideally would like to do it without medications.  She states that she does not eat mostly healthy and she could work towards a healthier lifestyle.  She has tried a nutritionist in the past.  She started a lifestyle change about 2 weeks ago and has lost 4 pounds.      Past Medical History:   Diagnosis Date    Abnormal Pap smear of cervix     30+ years ago    Class 2 obesity due to excess calories without serious comorbidity with body mass index (BMI) of 38.0 to 38.9 in adult     Endometriosis     diagnosed during c/s    Migraine        Past Surgical History:   Procedure Laterality Date     SECTION      DENTAL PROCEDURE         Social History     Socioeconomic History    Marital status:    Tobacco Use    Smoking status: Never     Passive exposure: Never    Smokeless tobacco: Never   Vaping Use    Vaping Use: Never used   Substance and Sexual Activity    Alcohol use: Yes     Comment: occasionally    Drug use: No    Sexual activity: Yes     Partners: Male     Birth control/protection: Post-menopausal  "        Current Outpatient Medications:     Fremanezumab-vfrm (Ajovy) 225 MG/1.5ML solution auto-injector, Inject 225 mg under the skin into the appropriate area as directed Every 30 (Thirty) Days., Disp: 1.5 mL, Rfl: 11    Omega-3 Fatty Acids (fish oil) 1000 MG capsule capsule, Take  by mouth., Disp: , Rfl:     pantoprazole (Protonix) 20 MG EC tablet, Take 1 tablet by mouth Daily., Disp: 30 tablet, Rfl: 11    rizatriptan (MAXALT) 10 MG tablet, TAKE ONE TABLET BY MOUTH AS ONE DOSE AS NEEDED FOR MIGRAINE FOR UP TO 3 DOSES PER DAY; MAY REPEAT IN 2 HOURS AS NEEDED, Disp: 10 tablet, Rfl: 6    Cholecalciferol 25 MCG (1000 UT) tablet, Take 1 tablet by mouth Daily. 2000 iu daily, Disp: , Rfl:     Objective     Vital Signs  /86   Pulse 83   Temp 97.8 °F (36.6 °C)   Ht 165.1 cm (65\")   Wt 113 kg (250 lb)   SpO2 96%   BMI 41.60 kg/m²   Estimated body mass index is 41.6 kg/m² as calculated from the following:    Height as of this encounter: 165.1 cm (65\").    Weight as of this encounter: 113 kg (250 lb).            PHQ-9 Depression Screening  Little interest or pleasure in doing things? 0-->not at all   Feeling down, depressed, or hopeless? 0-->not at all   Trouble falling or staying asleep, or sleeping too much?     Feeling tired or having little energy?     Poor appetite or overeating?     Feeling bad about yourself - or that you are a failure or have let yourself or your family down?     Trouble concentrating on things, such as reading the newspaper or watching television?     Moving or speaking so slowly that other people could have noticed? Or the opposite - being so fidgety or restless that you have been moving around a lot more than usual?     Thoughts that you would be better off dead, or of hurting yourself in some way?     PHQ-9 Total Score 0   If you checked off any problems, how difficult have these problems made it for you to do your work, take care of things at home, or get along with other people?   "     PHQ-9 Total Score: 0         Physical Exam  Vitals and nursing note reviewed.   Constitutional:       General: She is awake.      Appearance: Normal appearance. She is well-groomed. She is obese.   HENT:      Head: Normocephalic and atraumatic.      Right Ear: Hearing and external ear normal.      Left Ear: Hearing and external ear normal.      Nose: Nose normal.      Mouth/Throat:      Lips: Pink.      Mouth: Mucous membranes are moist.   Eyes:      Extraocular Movements: Extraocular movements intact.      Pupils: Pupils are equal, round, and reactive to light.      Comments: Glasses in place   Cardiovascular:      Rate and Rhythm: Normal rate and regular rhythm.      Pulses: Normal pulses.           Radial pulses are 2+ on the right side and 2+ on the left side.        Posterior tibial pulses are 2+ on the right side and 2+ on the left side.      Heart sounds: Normal heart sounds, S1 normal and S2 normal.   Pulmonary:      Effort: Pulmonary effort is normal.      Breath sounds: Normal breath sounds.   Abdominal:      General: Bowel sounds are normal.      Palpations: Abdomen is soft.   Musculoskeletal:         General: Normal range of motion.      Right lower leg: No edema.      Left lower leg: No edema.   Skin:     General: Skin is warm and dry.   Neurological:      Mental Status: She is alert and oriented to person, place, and time.   Psychiatric:         Mood and Affect: Mood normal.         Behavior: Behavior normal. Behavior is cooperative.         Thought Content: Thought content normal.         Judgment: Judgment normal.          Lab Review:   Latest Reference Range & Units 08/10/23 10:35   Sodium 136 - 145 mmol/L 142   Potassium 3.5 - 5.2 mmol/L 4.3   Chloride 98 - 107 mmol/L 105   CO2 22.0 - 29.0 mmol/L 25.9   BUN 6 - 20 mg/dL 10   Creatinine 0.57 - 1.00 mg/dL 0.78   BUN/Creatinine Ratio 7.0 - 25.0  12.8   EGFR Result >60.0 mL/min/1.73 92.7   Glucose 65 - 99 mg/dL 105 (H)   Calcium 8.6 - 10.5 mg/dL  9.5   Alkaline Phosphatase 39 - 117 U/L 82   Total Protein 6.0 - 8.5 g/dL 6.4   Albumin 3.5 - 5.2 g/dL 4.0   A/G Ratio g/dL 1.7   AST (SGOT) 1 - 32 U/L 22   ALT (SGPT) 1 - 33 U/L 38 (H)   Total Bilirubin 0.0 - 1.2 mg/dL 0.3   FSH mIU/mL 45.0   Hemoglobin A1C 4.80 - 5.60 % 5.90 (H)   Estradiol pg/mL 78.3   TSH Baseline 0.270 - 4.200 uIU/mL 1.730   Free T4 0.93 - 1.70 ng/dL 1.16   25 Hydroxy, Vitamin D 30.0 - 100.0 ng/ml 35.6   Globulin gm/dL 2.4   WBC 3.40 - 10.80 10*3/mm3 5.64   RBC 3.77 - 5.28 10*6/mm3 4.24   Hemoglobin 12.0 - 15.9 g/dL 13.1   Hematocrit 34.0 - 46.6 % 38.3   Platelets 140 - 450 10*3/mm3 257   RDW 12.3 - 15.4 % 12.3   MCV 79.0 - 97.0 fL 90.3   MCH 26.6 - 33.0 pg 30.9   MCHC 31.5 - 35.7 g/dL 34.2   Hep C Virus Ab Non Reactive  Non Reactive   (H): Data is abnormally high         Assessment and Plan     Diagnoses and all orders for this visit:    1. Establishing care with new doctor, encounter for (Primary)    2. Intractable migraine without aura and without status migrainosus    3. Jaw pain    4. Nausea    5. Gastroesophageal reflux disease, unspecified whether esophagitis present    6. Non-smoker    7. Need for vaccination  -     Shingrix Vaccine    8. No significant family history    9. Weight gain    10. Class 3 severe obesity due to excess calories with serious comorbidity and body mass index (BMI) of 40.0 to 44.9 in adult    11. Encounter for dietary counseling and surveillance    12. Wears glasses    13. Depression screen    14. Elevated hemoglobin A1c    Plan  Establish care visit completed with patient today    Continue to stay up-to-date with women's health neurology    Regarding her nausea and acid reflux related symptoms, she will increase her Protonix from 20 mg once a day to 20 mg twice a day.  Patient may need to have the pharmacy call us to provide a verbal release of a sooner refill of medication    Continue with non-smoking status    For shingles immunization was provided to patient  today    Continue to work towards a healthier lifestyle as well as weight loss.  Goal for next visit will be to lose 8 pounds    Patient will receive her tetanus shot at next visit    We will plan to follow-up in 1 month for weight check, tetanus shot, follow-up on Protonix twice daily.    Go to ER if any condition worsens or severe    Very pleasant visit with patient today    Follow Up  Return for 1 month follow up on 8 # weight loss, tdap shot, heartburn.    PANCHO Mack    Part of this note may be an electronic transcription/translation of spoken language to printed text using the Dragon Dictation System.

## 2023-11-17 NOTE — PROGRESS NOTES
Immunization  Immunization performed in Left Deltoid (1st shot) by Oma Hunter MA. Patient tolerated the procedure well without complications.  11/17/23   Oma Hunter MA

## 2023-11-28 ENCOUNTER — PATIENT ROUNDING (BHMG ONLY) (OUTPATIENT)
Dept: INTERNAL MEDICINE | Facility: CLINIC | Age: 48
End: 2023-11-28
Payer: COMMERCIAL

## 2023-11-29 NOTE — PROGRESS NOTES
A My-chart message has been sent to the patient for Patient Rounding with [facility].

## 2023-12-08 ENCOUNTER — SPECIALTY PHARMACY (OUTPATIENT)
Dept: NEUROLOGY | Facility: CLINIC | Age: 48
End: 2023-12-08
Payer: COMMERCIAL

## 2023-12-08 NOTE — PROGRESS NOTES
Specialty Pharmacy Refill Coordination Note     Moira is a 48 y.o. female contacted today regarding refills of  Ajovy (due 12/15_ specialty medication(s).    Reviewed and verified with patient:       Specialty medication(s) and dose(s) confirmed: yes    Refill Questions      Flowsheet Row Most Recent Value   Changes to allergies? No   Changes to medications? No   New conditions since last clinic visit No   Unplanned office visit, urgent care, ED, or hospital admission in the last 4 weeks  No   How does patient/caregiver feel medication is working? Very good   Financial problems or insurance changes  No   Since the previous refill, were any specialty medication doses or scheduled injections missed or delayed?  No   Does this patient require a clinical escalation to a pharmacist? No            Delivery Questions      Flowsheet Row Most Recent Value   Delivery method [employer]   Delivery address correct? Yes   Delivery phone number [phone]   Preferred delivery time? Anytime   Number of medications in delivery 1   Medication(s) being filled and delivered Fremanezumab-Vfrm   Doses left of specialty medications none-0-Ajovy due 12/15   Is there any medication that is due not being filled? No   Supplies needed? No supplies needed   Cooler needed? Yes   Do any medications need mixed or dated? No   Copay form of payment Credit card on file   Additional comments $5 Ajovy insurance/copay card   Questions or concerns for the pharmacist? No   Are any medications first time fills? No                   Follow-up: 1 month(s)     Radha Cho, PharmD

## 2024-01-10 ENCOUNTER — SPECIALTY PHARMACY (OUTPATIENT)
Dept: NEUROLOGY | Facility: CLINIC | Age: 49
End: 2024-01-10
Payer: COMMERCIAL

## 2024-01-11 ENCOUNTER — SPECIALTY PHARMACY (OUTPATIENT)
Dept: LAB | Facility: HOSPITAL | Age: 49
End: 2024-01-11
Payer: COMMERCIAL

## 2024-01-11 RX ORDER — ATOGEPANT 60 MG/1
60 TABLET ORAL DAILY
Qty: 30 TABLET | Refills: 11 | Status: SHIPPED | OUTPATIENT
Start: 2024-01-11

## 2024-01-11 NOTE — PROGRESS NOTES
Specialty Pharmacy Patient Management Program  Neurology Initial Assessment     Moira Cardoso is a 48 y.o. female with migraines seen by a Neurology provider and enrolled in the Neurology Patient Management program offered by [facility].  An initial outreach was conducted, including assessment of therapy appropriateness and specialty medication education for Qulipta (changing from Ajovy secondary to insurance/deductible issues). The patient was introduced to services offered by [facility], including: regular assessments, refill coordination, curbside pick-up or mail order delivery options, prior authorization maintenance, and financial assistance programs as applicable. The patient was also provided with contact information for the pharmacy team.     Insurance Coverage & Financial Support  [employer]  Qulipta Co-Pay Card    Relevant Past Medical History and Comorbidities  Relevant medical history and concomitant health conditions were discussed with the patient. The patient's chart has been reviewed for relevant past medical history and comorbid health conditions and updated as necessary.   Past Medical History:   Diagnosis Date    Abnormal Pap smear of cervix     30+ years ago    Class 2 obesity due to excess calories without serious comorbidity with body mass index (BMI) of 38.0 to 38.9 in adult     Endometriosis     diagnosed during c/s    Migraine      Social History     Socioeconomic History    Marital status:    Tobacco Use    Smoking status: Never     Passive exposure: Never    Smokeless tobacco: Never   Vaping Use    Vaping Use: Never used   Substance and Sexual Activity    Alcohol use: Yes     Comment: occasionally    Drug use: No    Sexual activity: Yes     Partners: Male     Birth control/protection: Post-menopausal     Problem list reviewed by Radha Cho, PharmD on 1/11/2024 at  8:45 AM    Allergies  Known allergies and reactions were  discussed with the patient. The patient's chart has been reviewed for  allergy information and updated as necessary.   Allergies   Allergen Reactions    Penicillins Hives     Allergies reviewed by Radha Cho, PharmD on 1/11/2024 at  8:44 AM    Relevant Laboratory Values  Common labs          8/10/2023    10:35   Common Labs   Glucose 105    BUN 10    Creatinine 0.78    Sodium 142    Potassium 4.3    Chloride 105    Calcium 9.5    Total Protein 6.4    Albumin 4.0    Total Bilirubin 0.3    Alkaline Phosphatase 82    AST (SGOT) 22    ALT (SGPT) 38    WBC 5.64    Hemoglobin 13.1    Hematocrit 38.3    Platelets 257    Hemoglobin A1C 5.90        Lab Assessment  The above labs have been reviewed. No dose adjustments are needed for the specialty medication(s) based on the labs.     Current Medication List  This medication list has been reviewed with the patient and evaluated for any interactions or necessary modifications/recommendations, and updated to include all prescription medications, OTC medications, and supplements the patient is currently taking.  This list reflects what is contained in the patient's profile, which has also been marked as reviewed to communicate to other providers it is the most up to date version of the patient's current medication therapy.     Current Outpatient Medications:     Atogepant (Qulipta) 60 MG tablet, Take 1 tablet by mouth Daily., Disp: 30 tablet, Rfl: 11    Cholecalciferol 25 MCG (1000 UT) tablet, Take 1 tablet by mouth Daily. 2000 iu daily, Disp: , Rfl:     Omega-3 Fatty Acids (fish oil) 1000 MG capsule capsule, Take  by mouth., Disp: , Rfl:     pantoprazole (Protonix) 20 MG EC tablet, Take 1 tablet by mouth Daily., Disp: 30 tablet, Rfl: 11    rizatriptan (MAXALT) 10 MG tablet, TAKE ONE TABLET BY MOUTH AS ONE DOSE AS NEEDED FOR MIGRAINE FOR UP TO 3 DOSES PER DAY; MAY REPEAT IN 2 HOURS AS NEEDED, Disp: 10 tablet, Rfl: 6    Medicines reviewed by Radha Cho, PharmD on  1/11/2024 at  8:44 AM    Drug Interactions  none     Initial Education Provided for Specialty Medication  The patient has been provided with the following education and any applicable administration techniques (i.e. self-injection) have been demonstrated for the therapies indicated. All questions and concerns have been addressed prior to the patient receiving the medication, and the patient has verbalized understanding of the education and any materials provided.  Additional patient education shall be provided and documented upon request by the patient, provider or payer.      Atogepant (Qulipta)        Medication Expectations   Why am I taking this medication? You are taking this medication for migraine prophylaxis.   What should I expect while on this medication? You should expect to a decrease in the frequency and severity of your migraines.   How does the medication work? Qulipta is a monoclonal antibody that binds to calcitonin gene-related peptide (CGRP) and blocks its binding to the receptor decreasing the severity of migraines.   How long will I be on this medication for? The amount of time you will be on this medication will be determined by your doctor and your response to the medication.    How do I take this medication? Take as directed on your prescription label.   Take one tablet daily with or without food.   What are some possible side effects? Potential side effects including, but not limited to nausea, constipation and fatigue. Pt verbalized understanding.   What happens if I miss a dose? If you miss a dose of this medicine, take it as soon as possible. However, if it is almost time for your next dose, skip the missed dose and go back to your regular dosing schedule. Do not double doses.                  Medication Safety   What are things I should warn my doctor immediately about? Hypersensitivity reactions, such as trouble breathing or swallowing.   What are things that I should be cautious of? Be  cautious driving until you know how this drug will affect you.   What are some medications that can interact with this one? Ketoconazole, Itraconazole, cyclosporin, clarithromycin, rifampin, carbamazepine, phenytion, Warren's Wort, efavirenz, and etravine.            Medication Storage/Handling   How should I handle this medication? Keep this medication our of reach of pets/children in original container.   How does this medication need to be stored? Store at room temperature away from heat/cold, sunlight or moisture.   How should I dispose of this medication? There should not be a need to dispose of this medication unless your provider decides to change the dose or therapy. If that is the case, take to your local police station for proper disposal. Some pharmacies also have take-back bins for medication drop-off.             Resources/Support   How can I remind myself to take this medication? You can download reminder apps to help you manage your refills. You may also set an alarm on your phone to remind you. The pharmacy carries pill boxes that you can place next to an area you pass everyday (such as where you place your car keys or where you charge your phone)   Is financial support available?  Yes, [employer] can provide co-pay cards if you have commercial insurance or patient assistance if you have Medicare or no insurance.    Which vaccines are recommended for me? Talk to your doctor about these vaccines: Flu, Coronavirus (COVID-19), Pneumococcal (pneumonia), Tdap, Hepatitis B, Zoster (shingles)           Adherence and Self-Administration  Adherence related to the patient's specialty therapy was discussed with the patient. The Adherence segment of this outreach has been reviewed and updated.   Is there a concern with patient's ability to self administer the medication correctly and without issue?: No  Were any potential barriers to adherence identified during the initial assessment or patient education?: No  Are  there any concerns regarding the patient's understanding of the importance of medication adherence?: No  Methods for Supporting Patient Adherence and/or Self-Administration: none    Goals of Therapy  Goals related to the patient's specialty therapy were discussed with the patient. The Patient Goals segment of this outreach has been reviewed and updated.   Goals Addressed Today        Specialty Pharmacy General Goal      Decrease frequency, severity and duration of migraine attacks from baseline. (~8 migraine days per month)                   Reassessment Plan & Follow-Up  Medication Therapy Changes: Discontinue Ajovy 225 mg subcutaneously monthly. Begin Qulipta 60 mg PO daily (secondary to insurance issues)  Related Plans, Therapy Recommendations, or Therapy Problems to Be Addressed: none  Pharmacist to perform regular reassessments no more than (6) months from the previous assessment.  Care Coordinator to set up future refill outreaches, coordinate prescription delivery, and escalate clinical questions to pharmacist.   Welcome information and patient satisfaction survey to be sent by specialty pharmacy team with patient's initial fill.    Attestation  Therapeutic appropriateness: Appropriate   I attest the patient was actively involved in and has agreed to the above plan of care. If the prescribed therapy is at any point deemed not appropriate based on the current or future assessments, a consultation will be initiated with the patient's specialty care provider to determine the best course of action. The revised plan of therapy will be documented along with any additional patient education provided. Discussed aforementioned material with patient via telemedicine.    Radha Cho, PharmD, [facility] Specialty Pharmacist, Neurology  1/11/2024  08:47 EST

## 2024-01-12 ENCOUNTER — OFFICE VISIT (OUTPATIENT)
Dept: INTERNAL MEDICINE | Facility: CLINIC | Age: 49
End: 2024-01-12
Payer: COMMERCIAL

## 2024-01-12 VITALS
WEIGHT: 233 LBS | SYSTOLIC BLOOD PRESSURE: 116 MMHG | BODY MASS INDEX: 38.82 KG/M2 | DIASTOLIC BLOOD PRESSURE: 76 MMHG | TEMPERATURE: 97.6 F | OXYGEN SATURATION: 100 % | HEART RATE: 74 BPM | HEIGHT: 65 IN

## 2024-01-12 DIAGNOSIS — E66.09 CLASS 2 OBESITY DUE TO EXCESS CALORIES WITHOUT SERIOUS COMORBIDITY WITH BODY MASS INDEX (BMI) OF 38.0 TO 38.9 IN ADULT: ICD-10-CM

## 2024-01-12 DIAGNOSIS — Z71.3 ENCOUNTER FOR DIETARY COUNSELING AND SURVEILLANCE: ICD-10-CM

## 2024-01-12 DIAGNOSIS — Z97.3 WEARS GLASSES: ICD-10-CM

## 2024-01-12 DIAGNOSIS — Z23 NEED FOR VACCINATION: ICD-10-CM

## 2024-01-12 DIAGNOSIS — R63.5 WEIGHT GAIN: ICD-10-CM

## 2024-01-12 DIAGNOSIS — Z09 ENCOUNTER FOR FOLLOW-UP: Primary | ICD-10-CM

## 2024-01-12 DIAGNOSIS — R68.84 JAW PAIN: ICD-10-CM

## 2024-01-12 NOTE — PROGRESS NOTES
"    Office Note     Name: Moira Cardoso    : 1975     MRN: [id number]     Chief Complaint  Weight Check    Subjective     History of Present Illness:  Moira Cardoso is a 48 y.o. female who presents today for follow-up from previous visit    Patient would like her tetanus shot completed in office today    Patient was last seen in November.  Our goal was to follow-up in 1 month for a weight check of an 8 pound weight loss, updated tetanus shot and follow-up on her heartburn symptoms.    Regarding her weight, at previous visit patient was 250 pounds.  Today she is 233 pounds.  Patient states she is feeling much better and is very excited about her weight loss.  She has been working very hard with dietary and exercise changes.    At a previous visit patient was concerned about her jaw pain.  She does have an upcoming appointment with neurology to discuss further as she is thinking it could possibly be a trigeminal neuralgia.  She has talked to her dentist who did provide recommendations about exercises.  She does not feel that it is a TMJ component.  Patient does not feel like she is clenching her teeth.  When she had COVID recently she states the symptoms completely went away but unfortunately they did come back when she recovered from COVID.  She has been to ENT and has been cleared from a ear and sinus standpoint.  Patient is unsure if this could be related to some menopausal symptoms of being more \"dried out.\"      Past Medical History:   Diagnosis Date    Abnormal Pap smear of cervix     30+ years ago    Class 2 obesity due to excess calories without serious comorbidity with body mass index (BMI) of 38.0 to 38.9 in adult     Endometriosis     diagnosed during c/s    Migraine        Past Surgical History:   Procedure Laterality Date     SECTION      DENTAL PROCEDURE         Social History     Socioeconomic History    Marital status:    Tobacco Use    Smoking status: Never     Passive " "exposure: Never    Smokeless tobacco: Never   Vaping Use    Vaping Use: Never used   Substance and Sexual Activity    Alcohol use: Yes     Comment: occasionally    Drug use: No    Sexual activity: Yes     Partners: Male     Birth control/protection: Post-menopausal         Current Outpatient Medications:     Atogepant (Qulipta) 60 MG tablet, Take 1 tablet by mouth Daily., Disp: 30 tablet, Rfl: 11    Cholecalciferol 25 MCG (1000 UT) tablet, Take 1 tablet by mouth Daily. 2000 iu daily, Disp: , Rfl:     Omega-3 Fatty Acids (fish oil) 1000 MG capsule capsule, Take  by mouth., Disp: , Rfl:     pantoprazole (Protonix) 20 MG EC tablet, Take 1 tablet by mouth Daily., Disp: 30 tablet, Rfl: 11    rizatriptan (MAXALT) 10 MG tablet, TAKE ONE TABLET BY MOUTH AS ONE DOSE AS NEEDED FOR MIGRAINE FOR UP TO 3 DOSES PER DAY; MAY REPEAT IN 2 HOURS AS NEEDED, Disp: 10 tablet, Rfl: 6    Objective     Vital Signs  /76   Pulse 74   Temp 97.6 °F (36.4 °C)   Ht 165.1 cm (65\")   Wt 106 kg (233 lb)   SpO2 100%   BMI 38.77 kg/m²   Estimated body mass index is 38.77 kg/m² as calculated from the following:    Height as of this encounter: 165.1 cm (65\").    Weight as of this encounter: 106 kg (233 lb).             Physical Exam  Vitals and nursing note reviewed.   Constitutional:       Appearance: Normal appearance. She is obese.   HENT:      Head: Normocephalic and atraumatic.   Eyes:      Extraocular Movements: Extraocular movements intact.      Pupils: Pupils are equal, round, and reactive to light.      Comments: Glasses in place   Cardiovascular:      Rate and Rhythm: Normal rate and regular rhythm.      Heart sounds: Normal heart sounds.   Pulmonary:      Effort: Pulmonary effort is normal.      Breath sounds: Normal breath sounds.   Musculoskeletal:         General: Normal range of motion.   Skin:     General: Skin is warm and dry.   Neurological:      Mental Status: She is alert and oriented to person, place, and time.      " "Comments: Mental status fully intact as patient was able to provide a detailed description of the events   Psychiatric:         Mood and Affect: Mood normal.         Behavior: Behavior normal.                   Assessment and Plan     Diagnoses and all orders for this visit:    1. Encounter for follow-up (Primary)    2. Need for vaccination  -     Tdap Vaccine Greater Than or Equal To 8yo IM    3. Weight gain    4. Class 2 obesity due to excess calories without serious comorbidity with body mass index (BMI) of 38.0 to 38.9 in adult    5. Encounter for dietary counseling and surveillance    6. Jaw pain    7. Wears glasses    Plan  Follow-up visit on multiple items completed with patient today    Tetanus shot was provided in office today    Patient will return to the clinic in about another 4 weeks to have her second shingles shot completed    Congratulated patient on her weight loss efforts.  Continue the hard work.  Continue with a healthy diet and exercise pattern.  Continue to increase water intake.    Regarding her jaw pain, patient has been screened from a number of specialists and we have worked to rule out the number of causes.  I do feel will be beneficial for patient to continue to stay up-to-date with neurology about these concerns.  Consider completing the exercises as provided from your dentist.  She could consider staying away from antihistamines that are causing her to be more \"dried out.\"  She could consider the use of a humidifier in her house as well as bedroom.  Continue stay well-hydrated.    Go to ER if any condition worsens or severe    Follow-up in 6 months for annual physical exam    Follow Up  Return for 6 months for annual physical .    PANCHO Mack    Part of this note may be an electronic transcription/translation of spoken language to printed text using the Dragon Dictation System.  "

## 2024-02-07 ENCOUNTER — SPECIALTY PHARMACY (OUTPATIENT)
Dept: NEUROLOGY | Facility: CLINIC | Age: 49
End: 2024-02-07
Payer: COMMERCIAL

## 2024-02-07 NOTE — PROGRESS NOTES
Specialty Pharmacy Refill Coordination Note     Moira is a 48 y.o. female contacted today regarding refills of Qulipta specialty medication(s).    Reviewed and verified with patient:       Specialty medication(s) and dose(s) confirmed: yes    Refill Questions      Flowsheet Row Most Recent Value   Changes to allergies? No   Changes to medications? No   New conditions or infections since last clinic visit No   Unplanned office visit, urgent care, ED, or hospital admission in the last 4 weeks  No   How does patient/caregiver feel medication is working? Very good   Financial problems or insurance changes  No   Since the previous refill, were any specialty medication doses or scheduled injections missed or delayed?  No   Does this patient require a clinical escalation to a pharmacist? No            Delivery Questions      Flowsheet Row Most Recent Value   Delivery method [employer]   Delivery address verified with patient/caregiver? Yes   Delivery address Home   Number of medications in delivery 1   Medication(s) being filled and delivered Atogepant   Doses left of specialty medications About a week   Copay verified? Yes   Copay amount 0.00   Copay form of payment No copayment ($0)                   Follow-up: 28 day(s)     Amirah Hamilton, Pharmacy Technician  Specialty Pharmacy Technician

## 2024-03-04 ENCOUNTER — SPECIALTY PHARMACY (OUTPATIENT)
Dept: LAB | Facility: HOSPITAL | Age: 49
End: 2024-03-04
Payer: COMMERCIAL

## 2024-03-04 NOTE — PROGRESS NOTES
Specialty Pharmacy Refill Coordination Note     Moira is a 48 y.o. female contacted today regarding refills of  Qulipta.    Reviewed and verified with patient:       Specialty medication(s) and dose(s) confirmed: yes    Refill Questions      Flowsheet Row Most Recent Value   Changes to allergies? No   Changes to medications? No   New conditions or infections since last clinic visit No   Unplanned office visit, urgent care, ED, or hospital admission in the last 4 weeks  No   How does patient/caregiver feel medication is working? Good   Financial problems or insurance changes  No   Since the previous refill, were any specialty medication doses or scheduled injections missed or delayed?  No   Does this patient require a clinical escalation to a pharmacist? No            Delivery Questions      Flowsheet Row Most Recent Value   Delivery method [employer]   Delivery address verified with patient/caregiver? Yes   Delivery address Home   Number of medications in delivery 1   Medication(s) being filled and delivered Atogepant   Doses left of specialty medications a few   Copay verified? Yes   Copay amount $0 insurance/copay card   Copay form of payment No copayment ($0)                   Follow-up: 1 month(s)     Radha Cho, PharmD

## 2024-04-04 ENCOUNTER — SPECIALTY PHARMACY (OUTPATIENT)
Dept: LAB | Facility: HOSPITAL | Age: 49
End: 2024-04-04
Payer: COMMERCIAL

## 2024-04-04 RX ORDER — FREMANEZUMAB-VFRM 225 MG/1.5ML
225 INJECTION SUBCUTANEOUS
Qty: 1.5 ML | Refills: 11 | Status: SHIPPED | OUTPATIENT
Start: 2024-04-04

## 2024-04-04 NOTE — PROGRESS NOTES
Specialty Pharmacy Patient Management Program  Neurology Initial Assessment     Moira Cardoso is a 48 y.o. female with migraines seen by a Neurology provider and enrolled in the Neurology Patient Management program offered by [facility].  An initial outreach was conducted, including assessment of therapy appropriateness and specialty medication education for Ajovy (Switching back from Qulipta). The patient was introduced to services offered by [facility], including: regular assessments, refill coordination, curbside pick-up or mail order delivery options, prior authorization maintenance, and financial assistance programs as applicable. The patient was also provided with contact information for the pharmacy team.     Insurance Coverage & Financial Support  [employer]  Ajovy copay card    Relevant Past Medical History and Comorbidities  Relevant medical history and concomitant health conditions were discussed with the patient. The patient's chart has been reviewed for relevant past medical history and comorbid health conditions and updated as necessary.   Past Medical History:   Diagnosis Date    Abnormal Pap smear of cervix     30+ years ago    Class 2 obesity due to excess calories without serious comorbidity with body mass index (BMI) of 38.0 to 38.9 in adult     Endometriosis     diagnosed during c/s    Migraine      Social History     Socioeconomic History    Marital status:    Tobacco Use    Smoking status: Never     Passive exposure: Never    Smokeless tobacco: Never   Vaping Use    Vaping status: Never Used   Substance and Sexual Activity    Alcohol use: Yes     Comment: occasionally    Drug use: No    Sexual activity: Yes     Partners: Male     Birth control/protection: Post-menopausal     Problem list reviewed by Radha Cho, PharmD on 4/4/2024 at  9:09 AM    Allergies  Known allergies and reactions were discussed with the patient. The patient's  chart has been reviewed for  allergy information and updated as necessary.   Allergies   Allergen Reactions    Penicillins Hives     Allergies reviewed by Radha Cho, PharmD on 4/4/2024 at  9:09 AM    Relevant Laboratory Values  Common labs          8/10/2023    10:35   Common Labs   Glucose 105    BUN 10    Creatinine 0.78    Sodium 142    Potassium 4.3    Chloride 105    Calcium 9.5    Total Protein 6.4    Albumin 4.0    Total Bilirubin 0.3    Alkaline Phosphatase 82    AST (SGOT) 22    ALT (SGPT) 38    WBC 5.64    Hemoglobin 13.1    Hematocrit 38.3    Platelets 257    Hemoglobin A1C 5.90        Lab Assessment  The above labs have been reviewed. No dose adjustments are needed for the specialty medication(s) based on the labs.     Current Medication List  This medication list has been reviewed with the patient and evaluated for any interactions or necessary modifications/recommendations, and updated to include all prescription medications, OTC medications, and supplements the patient is currently taking.  This list reflects what is contained in the patient's profile, which has also been marked as reviewed to communicate to other providers it is the most up to date version of the patient's current medication therapy.     Current Outpatient Medications:     Cholecalciferol 25 MCG (1000 UT) tablet, Take 1 tablet by mouth Daily. 2000 iu daily, Disp: , Rfl:     Fremanezumab-vfrm (Ajovy) 225 MG/1.5ML solution auto-injector, Inject 225 mg under the skin into the appropriate area as directed Every 30 (Thirty) Days., Disp: 1.5 mL, Rfl: 11    Omega-3 Fatty Acids (fish oil) 1000 MG capsule capsule, Take  by mouth., Disp: , Rfl:     pantoprazole (Protonix) 20 MG EC tablet, Take 1 tablet by mouth Daily., Disp: 30 tablet, Rfl: 11    rizatriptan (MAXALT) 10 MG tablet, TAKE ONE TABLET BY MOUTH AS ONE DOSE AS NEEDED FOR MIGRAINE FOR UP TO 3 DOSES PER DAY; MAY REPEAT IN 2 HOURS AS NEEDED, Disp: 10 tablet, Rfl: 6    Medicines  reviewed by Radha Cho, PharmD on 4/4/2024 at  9:09 AM    Drug Interactions  none     Initial Education Provided for Specialty Medication  The patient has been provided with the following education and any applicable administration techniques (i.e. self-injection) have been demonstrated for the therapies indicated. All questions and concerns have been addressed prior to the patient receiving the medication, and the patient has verbalized understanding of the education and any materials provided.  Additional patient education shall be provided and documented upon request by the patient, provider or payer.             Ajovy (fremanezumab-vfrm) 225 mg subQ every 28 days        Medication Expectations   Why am I taking this medication? You are taking this medication for migraine prophylaxis.   What should I expect while on this medication? You should expect to a decrease in the frequency and severity of your migraines.   How does the medication work? Ajovy is a monoclonal antibody that binds to calcitonin gene-related peptide (CGRP) and blocks its binding to the receptor decreasing the severity of migraines.   How long will I be on this medication for? The amount of time you will be on this medication will be determined by your doctor and your response to the medication.    How do I take this medication? Take as directed on your prescription label. This medication is a self-injection given every 28 days.    What are some possible side effects? Injection site reactions and hypersensitivity reactions.   What happens if I miss a dose? If you miss a dose, take it as soon as you remember, and time next dose 28 days from last dose.                  Medication Safety   What are things I should warn my doctor immediately about? Cases of anaphylaxis and angioedema have been reported in the postmarketing setting. If a reaction occurs, notify your doctor immediately.   What are things that I should be cautious of?  Injection site reaction and hypersensitivity reactions, including rash, pruritus, drug hypersensitivity, and urticaria   What are some medications that can interact with this one? No drug interactions identified.            Medication Storage/Handling   How should I handle this medication? Keep this medication our of reach of pets/children in original container.  On the day your Ajovy is due let it set at room temperature for 30 minutes prior to injection. (do NOT warm using a heat source such as hot water or a microwave).  Administer in the abdomen, thigh, back of the upper arm, or buttocks.  Do not inject where the skin is tender, bruised, red or hard.  Rotate injection sites.   How does this medication need to be stored? Store in refrigerator and keep dry.   How should I dispose of this medication? You can dispose of the empty syringe in a sharps container, and if this is not available you may use an empty hard plastic container such as a milk jug or tide container.            Resources/Support   How can I remind myself to take this medication? You can download a reminder zach on your phone or use a calandar  to help with your monthly injection.   Is financial support available?  Yes, [employer] can provide co-pay cards if you have commercial insurance or patient assistance if you have Medicare or no insurance.    Which vaccines are recommended for me? Talk to your doctor about these vaccines: Flu, Coronavirus (COVID-19), Pneumococcal (pneumonia), Tdap, Hepatitis B, Zoster (shingles)                   Adherence and Self-Administration  Adherence related to the patient's specialty therapy was discussed with the patient. The Adherence segment of this outreach has been reviewed and updated.   Is there a concern with patient's ability to self administer the medication correctly and without issue?: No  Were any potential barriers to adherence identified during the initial assessment or patient education?:  No  Are there any concerns regarding the patient's understanding of the importance of medication adherence?: No  Methods for Supporting Patient Adherence and/or Self-Administration: none    Goals of Therapy  Goals related to the patient's specialty therapy were discussed with the patient. The Patient Goals segment of this outreach has been reviewed and updated.   Goals Addressed Today        Specialty Pharmacy General Goal      Decrease frequency, severity and duration of migraine attacks from baseline. (~8 migraine days per month)                   Reassessment Plan & Follow-Up  Medication Therapy Changes: Ajovy 225 mg subcutaneously monthly. This will will replace Qulipta.  Related Plans, Therapy Recommendations, or Therapy Problems to Be Addressed: switching back to Ajovy from Qulipta after deductible was met.  Pharmacist to perform regular reassessments no more than (6) months from the previous assessment.  Care Coordinator to set up future refill outreaches, coordinate prescription delivery, and escalate clinical questions to pharmacist.   Welcome information and patient satisfaction survey to be sent by specialty pharmacy team with patient's initial fill.    Attestation  Therapeutic appropriateness: Appropriate   I attest the patient was actively involved in and has agreed to the above plan of care. If the prescribed therapy is at any point deemed not appropriate based on the current or future assessments, a consultation will be initiated with the patient's specialty care provider to determine the best course of action. The revised plan of therapy will be documented along with any additional patient education provided. Discussed aforementioned material with patient via telemedicine.    Radha Cho, [place], [facility] Specialty Pharmacist, Neurology  4/4/2024  09:12 EDT

## 2024-04-19 ENCOUNTER — OFFICE VISIT (OUTPATIENT)
Dept: INTERNAL MEDICINE | Facility: CLINIC | Age: 49
End: 2024-04-19
Payer: COMMERCIAL

## 2024-04-19 VITALS
HEART RATE: 78 BPM | BODY MASS INDEX: 39.15 KG/M2 | RESPIRATION RATE: 16 BRPM | TEMPERATURE: 97 F | OXYGEN SATURATION: 99 % | DIASTOLIC BLOOD PRESSURE: 82 MMHG | HEIGHT: 65 IN | SYSTOLIC BLOOD PRESSURE: 130 MMHG | WEIGHT: 235 LBS

## 2024-04-19 DIAGNOSIS — M25.512 ACUTE PAIN OF LEFT SHOULDER: Primary | ICD-10-CM

## 2024-04-19 DIAGNOSIS — K21.9 GASTROESOPHAGEAL REFLUX DISEASE, UNSPECIFIED WHETHER ESOPHAGITIS PRESENT: ICD-10-CM

## 2024-04-19 DIAGNOSIS — M75.02 ADHESIVE CAPSULITIS OF LEFT SHOULDER: ICD-10-CM

## 2024-04-19 DIAGNOSIS — Z23 NEED FOR VACCINATION: ICD-10-CM

## 2024-04-19 DIAGNOSIS — R68.84 JAW PAIN: ICD-10-CM

## 2024-04-19 PROCEDURE — 90750 HZV VACC RECOMBINANT IM: CPT | Performed by: NURSE PRACTITIONER

## 2024-04-19 PROCEDURE — 99214 OFFICE O/P EST MOD 30 MIN: CPT | Performed by: NURSE PRACTITIONER

## 2024-04-19 RX ORDER — METHOCARBAMOL 500 MG/1
500 TABLET, FILM COATED ORAL 4 TIMES DAILY
Qty: 40 TABLET | Refills: 2 | Status: SHIPPED | OUTPATIENT
Start: 2024-04-19

## 2024-04-19 RX ORDER — LIDOCAINE 50 MG/G
1 PATCH TOPICAL EVERY 24 HOURS
Qty: 30 PATCH | Refills: 2 | Status: SHIPPED | OUTPATIENT
Start: 2024-04-19

## 2024-04-19 RX ORDER — METHYLPREDNISOLONE 4 MG/1
TABLET ORAL
Qty: 21 TABLET | Refills: 0 | Status: SHIPPED | OUTPATIENT
Start: 2024-04-19

## 2024-04-19 NOTE — PROGRESS NOTES
Office Note     Name: Moira Cardoso    : 1975     MRN: [id number]     Chief Complaint  Arm Pain and Shoulder Pain (Going on for a month - increasing pain, decreasing ROM - LArm)    Subjective     History of Present Illness:  Moira Cardoso is a 48 y.o. female who presents today for acute concerns    Patient does have a history of frozen shoulder.  She is here today describing some left arm and shoulder pain.  The symptoms are very similar to what she had before.  She does have noted limited range of motion.  This does include reaching behind her to touch her back.  She only has pain with movement.  Sleeping has now become uncomfortable.  She has seen [facility] orthopedics in the past.  She would like to be seen by orthopedics and is open to a physical therapy referral.  Patient is still able to get her arm overhead for example to brush her hair and do her make-up    Patient previous visit has discussed her concerns related to this abnormal jaw, ear, neck, throat pain.  She has seen multiple specialist regarding this without any significant findings or abnormalities.  She is requesting referral to GI as this could maybe be a concern related to some silent reflux.  Overall she would like a referral to GI for further evaluation        Past Medical History:   Diagnosis Date    Abnormal Pap smear of cervix     30+ years ago    Class 2 obesity due to excess calories without serious comorbidity with body mass index (BMI) of 38.0 to 38.9 in adult     Endometriosis     diagnosed during c/s    Migraine        Past Surgical History:   Procedure Laterality Date     SECTION      DENTAL PROCEDURE         Social History     Socioeconomic History    Marital status:    Tobacco Use    Smoking status: Never     Passive exposure: Never    Smokeless tobacco: Never   Vaping Use    Vaping status: Never Used   Substance and Sexual Activity    Alcohol use: Yes     Comment: occasionally    Drug use: No     "Sexual activity: Yes     Partners: Male     Birth control/protection: Post-menopausal         Current Outpatient Medications:     Cholecalciferol 25 MCG (1000 UT) tablet, Take 1 tablet by mouth Daily. 2000 iu daily, Disp: , Rfl:     Fremanezumab-vfrm (Ajovy) 225 MG/1.5ML solution auto-injector, Inject 225 mg under the skin into the appropriate area as directed Every 30 (Thirty) Days., Disp: 1.5 mL, Rfl: 11    Omega-3 Fatty Acids (fish oil) 1000 MG capsule capsule, Take  by mouth., Disp: , Rfl:     pantoprazole (Protonix) 20 MG EC tablet, Take 1 tablet by mouth Daily., Disp: 30 tablet, Rfl: 11    rizatriptan (MAXALT) 10 MG tablet, TAKE ONE TABLET BY MOUTH AS ONE DOSE AS NEEDED FOR MIGRAINE FOR UP TO 3 DOSES PER DAY; MAY REPEAT IN 2 HOURS AS NEEDED, Disp: 10 tablet, Rfl: 6    Diclofenac Sodium (VOLTAREN) 1 % gel gel, Apply 4 g topically to the appropriate area as directed 4 (Four) Times a Day As Needed (pain)., Disp: 350 g, Rfl: 2    lidocaine (LIDODERM) 5 %, Place 1 patch on the skin as directed by provider Daily. Remove & Discard patch within 12 hours or as directed by MD, Disp: 30 patch, Rfl: 2    methocarbamol (ROBAXIN) 500 MG tablet, Take 1 tablet by mouth 4 (Four) Times a Day., Disp: 40 tablet, Rfl: 2    methylPREDNISolone (MEDROL) 4 MG dose pack, Take as directed on package instructions., Disp: 21 tablet, Rfl: 0    Objective     Vital Signs  /82   Pulse 78   Temp 97 °F (36.1 °C) (Temporal)   Resp 16   Ht 165.1 cm (65\")   Wt 107 kg (235 lb)   SpO2 99%   BMI 39.11 kg/m²   Estimated body mass index is 39.11 kg/m² as calculated from the following:    Height as of this encounter: 165.1 cm (65\").    Weight as of this encounter: 107 kg (235 lb).             Physical Exam  Vitals and nursing note reviewed.   Constitutional:       Appearance: Normal appearance.   HENT:      Head: Normocephalic and atraumatic.   Eyes:      Extraocular Movements: Extraocular movements intact.      Pupils: Pupils are equal, " round, and reactive to light.      Comments: Glasses in place   Cardiovascular:      Rate and Rhythm: Normal rate and regular rhythm.   Pulmonary:      Effort: Pulmonary effort is normal.   Musculoskeletal:         General: Normal range of motion.      Left shoulder: Tenderness present. No swelling or deformity. Decreased range of motion.   Skin:     General: Skin is warm and dry.   Neurological:      Mental Status: She is alert and oriented to person, place, and time.      Comments: Mental status fully intact as patient was able to provide a detailed description of the events   Psychiatric:         Mood and Affect: Mood normal.         Behavior: Behavior normal.                 Assessment and Plan     Diagnoses and all orders for this visit:    1. Acute pain of left shoulder (Primary)  -     Ambulatory Referral to Orthopedic Surgery  -     Ambulatory Referral to Physical Therapy  -     methylPREDNISolone (MEDROL) 4 MG dose pack; Take as directed on package instructions.  Dispense: 21 tablet; Refill: 0  -     lidocaine (LIDODERM) 5 %; Place 1 patch on the skin as directed by provider Daily. Remove & Discard patch within 12 hours or as directed by MD  Dispense: 30 patch; Refill: 2  -     methocarbamol (ROBAXIN) 500 MG tablet; Take 1 tablet by mouth 4 (Four) Times a Day.  Dispense: 40 tablet; Refill: 2  -     Diclofenac Sodium (VOLTAREN) 1 % gel gel; Apply 4 g topically to the appropriate area as directed 4 (Four) Times a Day As Needed (pain).  Dispense: 350 g; Refill: 2    2. Adhesive capsulitis of left shoulder  -     Ambulatory Referral to Orthopedic Surgery  -     Ambulatory Referral to Physical Therapy  -     methylPREDNISolone (MEDROL) 4 MG dose pack; Take as directed on package instructions.  Dispense: 21 tablet; Refill: 0  -     lidocaine (LIDODERM) 5 %; Place 1 patch on the skin as directed by provider Daily. Remove & Discard patch within 12 hours or as directed by MD  Dispense: 30 patch; Refill: 2  -      methocarbamol (ROBAXIN) 500 MG tablet; Take 1 tablet by mouth 4 (Four) Times a Day.  Dispense: 40 tablet; Refill: 2  -     Diclofenac Sodium (VOLTAREN) 1 % gel gel; Apply 4 g topically to the appropriate area as directed 4 (Four) Times a Day As Needed (pain).  Dispense: 350 g; Refill: 2    3. Jaw pain  -     Ambulatory Referral to Gastroenterology    4. Gastroesophageal reflux disease, unspecified whether esophagitis present  -     Ambulatory Referral to Gastroenterology    5. Need for vaccination  -     Shingrix Vaccine    Plan  Follow-up visit completed with patient today    Regarding her shoulder, will hold on x-ray at this time.  Patient has had frozen shoulder in the past and is having very similar symptoms.  At this time we will move forward with symptom management.  Referral to orthopedics as well as physical therapy was placed.  Medication sent to pharmacy with additional refills.  Continue with rest ice compression elevation and alternating use of NSAIDs as well as Tylenol    Regarding her concern related to her jaw pain and ear and throat symptoms, referral to GI will be placed for further evaluation.    Patient will receive her second shingles vaccine today in office.    Go to ER if any condition worsens or severe    Plan to follow-up as scheduled in July    Follow Up  Return for As scheduled in July.    PANCHO Mack    Part of this note may be an electronic transcription/translation of spoken language to printed text using the Dragon Dictation System.

## 2024-04-30 ENCOUNTER — OFFICE VISIT (OUTPATIENT)
Dept: NEUROLOGY | Facility: CLINIC | Age: 49
End: 2024-04-30
Payer: COMMERCIAL

## 2024-04-30 VITALS
OXYGEN SATURATION: 98 % | BODY MASS INDEX: 39.11 KG/M2 | SYSTOLIC BLOOD PRESSURE: 138 MMHG | HEIGHT: 65 IN | HEART RATE: 100 BPM | DIASTOLIC BLOOD PRESSURE: 98 MMHG

## 2024-04-30 DIAGNOSIS — G43.019 INTRACTABLE MIGRAINE WITHOUT AURA AND WITHOUT STATUS MIGRAINOSUS: Primary | ICD-10-CM

## 2024-04-30 PROCEDURE — 99214 OFFICE O/P EST MOD 30 MIN: CPT | Performed by: PSYCHIATRY & NEUROLOGY

## 2024-04-30 RX ORDER — CARBAMAZEPINE 100 MG/1
100 TABLET, EXTENDED RELEASE ORAL DAILY
Qty: 30 TABLET | Refills: 3 | Status: SHIPPED | OUTPATIENT
Start: 2024-04-30 | End: 2024-05-30

## 2024-04-30 NOTE — PROGRESS NOTES
Subjective:    CC: Moira Cardoso is in clinic today for follow up for history of migraines.    HPI:  9/19/2019: She is in clinic for regular follow-up.  Since her last visit, she has been taking Ajovy every month.  She is now done about 5 Ajovy injections.  She reports that the effect with Ajovy is as not as good as what it was with Aimovig.  With Aimovig, she had complete resolution of headaches but with Ajovy, she is getting 3-4 breakthrough headaches in a month.  Still better than more than 15 headaches that she was experiencing prior to starting CGRP blockers.  She takes rizatriptan or ibuprofen as needed as an abortive therapy and it works really well.  She denies any side effects with Ajovy use.  She used Aimovig for 6 months prior to Ajovy and then insurance denied it.    9/9/2020: She is in clinic for regular follow-up.  Since her last visit 1 year ago, she reports that in the last 3 to 4 months, migraine intensity and frequency is slightly worse.  She is now experiencing 5-6 breakthrough migraines increase from 2-3 breakthrough migraines.  She is on Ajovy monthly injections and has done it for 1 year now.  Prior to Ajovy, she was on Aimovig and it had helped her significantly in controlling migraines but unfortunately insurance denied it.  She currently takes Maxalt as needed as an abortive treatment and does report side effects with Maxalt use.    1/13/2021: She is in clinic for regular follow-up.  Since her last visit in September, insurance did not approve Emgality.  She did try Ajovy prior to trying Emgality for about 6 months but it did not really help.  She had an excellent response with Aimovig monthly injection.  In the past, she has tried and failed Topamax and propranolol.    11/9/2021: She is in clinic for regular follow-up.  Since her last visit in January 2021, Aimovig is now approved by insurance and she has been taking it every month.  She reports that she has had excellent response with  Aimovig and has had almost complete resolution of migraines.  Whenever she gets breakthrough migraine, she will use rizatriptan at the onset and it does work as an abortive treatment.    6/20/2022: She is in clinic for regular follow-up.  Since her last visit in November 2021, she reports that while he was reevaluated excellent control.  She was on Aimovig which was switched to Ajovy due to insurance.  Initially with Ajovy, migraines were little worse but with continued use of Ajovy, they have become better.  She reports that she has been migraine free for last several months and did not have to take Maxalt.  She is tolerating Ajovy well without any side effects as well.    3/20/2023: She is in clinic for regular follow-up.  Since her last visit in June 2022, she has been doing Ajovy once a month injection with excellent response.  She reports that she has had only few migraines since her last visit.  She uses Maxalt with naproxen and this combination works really well as an abortive treatment.  She is now postmenopausal and is wondering if stopping Ajovy would be a good idea and see if menopause will reduce intensity and frequency of migraines naturally.  In addition to this, she reports that she continues to have this bilateral jaw area tenderness and mild intensity pain throughout the day.  She wakes up with it and goes to bed with it.  On the last visit, she had complained of bilateral ear pain for which I had to send her to ENT and detailed ENT evaluation did not reveal any abnormalities but now this pain is shifted to jaw area.    10/20/2023: She is in clinic for regular follow-up.  Since her last visit in March 2023, due to insurance change, Ajovy had to be switched to Aimovig and then insurance denied Aimovig so she was started on Nurtec every other day about 6 to 8 weeks ago.  With Nurtec, she has not had good response.  She is reporting 8 migraine days in a month.  Maxalt is working well as an abortive  treatment.  We will try muscle relaxer for bilateral jaw pain but it made her really sleepy.  She reports that it is worse first thing in the morning and then she does experiences throughout the day as well.  She takes ibuprofen as needed which helps but then pain returns once the effect of ibuprofen wears off.    4/13/2024: She is in clinic for regular follow-up.  Since her last visit in October 2023, insurance did approve Ajovy so she has been on Ajovy for last 6 months.  She has had an excellent response and reports that she did not have to take any Maxalt in last 6 months.  However the most important concern is bilateral temple area pain, ache bilateral jaw pain and sometimes burning sensation involving the jaw and neck area bilaterally in front.  She has seen ENT and her dentist who felt that this was not related to TMJ and may be coming from GERD.  She takes Tylenol when it becomes really bad and it does help temporarily and then the symptoms returned.  She has taken ibuprofen as well.    The following portions of the patient's history were reviewed and updated as of 04/30/2024: allergies, social history, and problem list.       Current Outpatient Medications:     Cholecalciferol 25 MCG (1000 UT) tablet, Take 1 tablet by mouth Daily. 2000 iu daily, Disp: , Rfl:     Fremanezumab-vfrm (Ajovy) 225 MG/1.5ML solution auto-injector, Inject 225 mg under the skin into the appropriate area as directed Every 30 (Thirty) Days., Disp: 1.5 mL, Rfl: 11    lidocaine (LIDODERM) 5 %, Place 1 patch on the skin as directed by provider Daily. Remove & Discard patch within 12 hours or as directed by MD, Disp: 30 patch, Rfl: 2    methocarbamol (ROBAXIN) 500 MG tablet, Take 1 tablet by mouth 4 (Four) Times a Day., Disp: 40 tablet, Rfl: 2    Omega-3 Fatty Acids (fish oil) 1000 MG capsule capsule, Take  by mouth., Disp: , Rfl:     pantoprazole (Protonix) 20 MG EC tablet, Take 1 tablet by mouth Daily., Disp: 30 tablet, Rfl: 11     "rizatriptan (MAXALT) 10 MG tablet, TAKE ONE TABLET BY MOUTH AS ONE DOSE AS NEEDED FOR MIGRAINE FOR UP TO 3 DOSES PER DAY; MAY REPEAT IN 2 HOURS AS NEEDED, Disp: 10 tablet, Rfl: 6    Diclofenac Sodium (VOLTAREN) 1 % gel gel, Apply 4 g topically to the appropriate area as directed 4 (Four) Times a Day As Needed (pain). (Patient not taking: Reported on 2024), Disp: 350 g, Rfl: 2    methylPREDNISolone (MEDROL) 4 MG dose pack, Take as directed on package instructions., Disp: 21 tablet, Rfl: 0   Past Medical History:   Diagnosis Date    Abnormal Pap smear of cervix     30+ years ago    Class 2 obesity due to excess calories without serious comorbidity with body mass index (BMI) of 38.0 to 38.9 in adult     Endometriosis     diagnosed during c/s    Migraine       Past Surgical History:   Procedure Laterality Date     SECTION      DENTAL PROCEDURE        Family History   Problem Relation Age of Onset    Mental illness Mother         Depression    Transient ischemic attack Mother     Hypertension Mother     Kidney failure Father         amyloidosis- dialysis    Alzheimer's disease Maternal Grandmother     Osteoporosis Maternal Grandmother     Parkinsonism Paternal Grandfather     Breast cancer Neg Hx     Ovarian cancer Neg Hx     Uterine cancer Neg Hx     Colon cancer Neg Hx         Review of Systems  Objective:    /98   Pulse 100   Ht 165.1 cm (65\")   LMP 2021   SpO2 98%   BMI 39.11 kg/m²     Neurology Exam:  General apperance: NAD.     Mental status: Alert, awake and oriented to time place and person.    Language and Speech: No aphasia or dysarthria.    CN II to XII: Intact.    Opthalmoscopic Exam: No papilledema.    Motor:  Right UE muscle strength 5/5. Normal tone.     Left UE muscle strength 5/5. Normal tone.      Right LE muscle strength 5/5. Normal tone.     Left LE muscle strength 5/5. Normal tone.      Sensory: Normal light touch, vibration and pinprick sensation bilaterally.    DTRs: " 2+ bilaterally.    Babinski: Negative bilaterally.    Co-ordination: Normal finger-to-nose, heel to shin B/L.    Rhomberg: Negative.    Gait: Normal.    Cardiovascular: Regular rate and rhythm without murmur, gallop or rub.    Assessment and Plan:  1. Intractable migraine without aura and without status migrainosus  -Migraines under excellent control with Ajovy once a month injection.  She did not have to take Maxalt since her last visit 6 months ago.  Most important concern is bilateral temple, jaw area pain and ache as well as dysesthesias involving bilateral jaw region.  She was told that she does not have TMJ but some of the symptoms are suggestive of TMJ.  Her symptoms are also not typical of trigeminal neuralgia but considering some symptoms of dysesthesias and paresthesia, I am going to give her a trial of low-dose Tegretol- mg daily and see how she does.  If it does not help then I advised her to talk to dentist with regards to doing Botox and see if it helps with symptoms otherwise continue to Ajovy and Maxalt as per schedule and I will see her back in clinic in 6 months for follow-up.       I spent 30 minutes in patient care: Reviewing records prior to the visit, entering orders and documentation and spent more than torres 50% of this time face-to-face in management, instructions and education regarding above mentioned diagnosis and also on counseling and discussing about taking medication regularly, possible side effects with medication use, importance of good sleep hygiene, good hydration and regular exercise.    No follow-ups on file.       Note to patient: The 21st Century Cures Act makes medical notes like these available to patients in the interest of transparency. However, be advised this is a medical document. It is intended as peer to peer communication. It is written in medical language and may contain abbreviations or verbiage that are unfamiliar. It may appear blunt or direct. Medical  documents are intended to carry relevant information, facts as evident, and the clinical opinion of the physician.

## 2024-05-02 ENCOUNTER — HOSPITAL ENCOUNTER (OUTPATIENT)
Dept: MAMMOGRAPHY | Facility: HOSPITAL | Age: 49
Discharge: HOME | End: 2024-05-02
Admitting: OBSTETRICS & GYNECOLOGY
Payer: COMMERCIAL

## 2024-05-02 DIAGNOSIS — Z12.39 ENCOUNTER FOR BREAST CANCER SCREENING USING NON-MAMMOGRAM MODALITY: ICD-10-CM

## 2024-05-02 PROCEDURE — 77067 SCR MAMMO BI INCL CAD: CPT

## 2024-05-02 PROCEDURE — 77063 BREAST TOMOSYNTHESIS BI: CPT

## 2024-05-02 PROCEDURE — 77067 SCR MAMMO BI INCL CAD: CPT | Performed by: RADIOLOGY

## 2024-05-02 PROCEDURE — 77063 BREAST TOMOSYNTHESIS BI: CPT | Performed by: RADIOLOGY

## 2024-05-03 ENCOUNTER — OFFICE VISIT (OUTPATIENT)
Dept: ORTHOPEDIC SURGERY | Facility: CLINIC | Age: 49
End: 2024-05-03
Payer: COMMERCIAL

## 2024-05-03 VITALS
DIASTOLIC BLOOD PRESSURE: 72 MMHG | BODY MASS INDEX: 38.75 KG/M2 | WEIGHT: 232.6 LBS | SYSTOLIC BLOOD PRESSURE: 122 MMHG | HEIGHT: 65 IN

## 2024-05-03 DIAGNOSIS — R73.03 PREDIABETES: ICD-10-CM

## 2024-05-03 DIAGNOSIS — M75.02 ADHESIVE CAPSULITIS OF LEFT SHOULDER: ICD-10-CM

## 2024-05-03 DIAGNOSIS — R20.0 NUMBNESS AND TINGLING IN LEFT ARM: ICD-10-CM

## 2024-05-03 DIAGNOSIS — M25.512 LEFT SHOULDER PAIN, UNSPECIFIED CHRONICITY: Primary | ICD-10-CM

## 2024-05-03 DIAGNOSIS — R20.2 NUMBNESS AND TINGLING IN LEFT ARM: ICD-10-CM

## 2024-05-03 RX ORDER — TRIAMCINOLONE ACETONIDE 40 MG/ML
1 INJECTION, SUSPENSION INTRA-ARTICULAR; INTRAMUSCULAR
Status: COMPLETED | OUTPATIENT
Start: 2024-05-03 | End: 2024-05-03

## 2024-05-03 RX ORDER — BUPIVACAINE HYDROCHLORIDE 2.5 MG/ML
4 INJECTION, SOLUTION EPIDURAL; INFILTRATION; INTRACAUDAL
Status: COMPLETED | OUTPATIENT
Start: 2024-05-03 | End: 2024-05-03

## 2024-05-03 RX ORDER — LIDOCAINE HYDROCHLORIDE 10 MG/ML
4 INJECTION, SOLUTION EPIDURAL; INFILTRATION; INTRACAUDAL; PERINEURAL
Status: COMPLETED | OUTPATIENT
Start: 2024-05-03 | End: 2024-05-03

## 2024-05-03 RX ADMIN — TRIAMCINOLONE ACETONIDE 1 ML: 40 INJECTION, SUSPENSION INTRA-ARTICULAR; INTRAMUSCULAR at 08:41

## 2024-05-03 RX ADMIN — BUPIVACAINE HYDROCHLORIDE 4 ML: 2.5 INJECTION, SOLUTION EPIDURAL; INFILTRATION; INTRACAUDAL at 08:41

## 2024-05-03 RX ADMIN — LIDOCAINE HYDROCHLORIDE 4 ML: 10 INJECTION, SOLUTION EPIDURAL; INFILTRATION; INTRACAUDAL; PERINEURAL at 08:41

## 2024-05-03 NOTE — PROGRESS NOTES
[facility] [place] [facility] Note        Subjective     Pain of the Left Shoulder      HPI    Moira Cardoso is a 48 y.o. female.  She has a history of frozen shoulder in the past.  Over the past 2 months she has increased pain and stiffness of left shoulder.  She says she has prediabetes.  She works in .  No recent treatment.  She thinks she has a frozen shoulder and she is referred here for treatment.  She has intermittent symptoms of numbness and tingling down to the hand.    Past Medical History:   Diagnosis Date   • Abnormal Pap smear of cervix     30+ years ago   • Class 2 obesity due to excess calories without serious comorbidity with body mass index (BMI) of 38.0 to 38.9 in adult    • Endometriosis     diagnosed during c/s   • Migraine       Past Surgical History:   Procedure Laterality Date   •  SECTION     • DENTAL PROCEDURE        Family History   Problem Relation Age of Onset   • Mental illness Mother         Depression   • Transient ischemic attack Mother    • Hypertension Mother    • Kidney failure Father         amyloidosis- dialysis   • Alzheimer's disease Maternal Grandmother    • Osteoporosis Maternal Grandmother    • Parkinsonism Paternal Grandfather    • Breast cancer Neg Hx    • Ovarian cancer Neg Hx    • Uterine cancer Neg Hx    • Colon cancer Neg Hx      Social History     Socioeconomic History   • Marital status:    Tobacco Use   • Smoking status: Never     Passive exposure: Never   • Smokeless tobacco: Never   Vaping Use   • Vaping status: Never Used   Substance and Sexual Activity   • Alcohol use: Yes     Comment: occasionally   • Drug use: No   • Sexual activity: Yes     Partners: Male     Birth control/protection: Post-menopausal      Current Outpatient Medications on File Prior to Visit   Medication Sig Dispense Refill   • carBAMazepine XR (TEGretol-XR) 100 MG 12 hr tablet Take 1 tablet by mouth Daily for 30 days. 30 tablet 3   • Cholecalciferol 25 MCG  (1000 UT) tablet Take 1 tablet by mouth Daily. 2000 iu daily     • Diclofenac Sodium (VOLTAREN) 1 % gel gel Apply 4 g topically to the appropriate area as directed 4 (Four) Times a Day As Needed (pain). 350 g 2   • Fremanezumab-vfrm (Ajovy) 225 MG/1.5ML solution auto-injector Inject 225 mg under the skin into the appropriate area as directed Every 30 (Thirty) Days. 1.5 mL 11   • lidocaine (LIDODERM) 5 % Place 1 patch on the skin as directed by provider Daily. Remove & Discard patch within 12 hours or as directed by MD 30 patch 2   • methocarbamol (ROBAXIN) 500 MG tablet Take 1 tablet by mouth 4 (Four) Times a Day. 40 tablet 2   • Omega-3 Fatty Acids (fish oil) 1000 MG capsule capsule Take  by mouth.     • pantoprazole (Protonix) 20 MG EC tablet Take 1 tablet by mouth Daily. 30 tablet 11   • rizatriptan (MAXALT) 10 MG tablet TAKE ONE TABLET BY MOUTH AS ONE DOSE AS NEEDED FOR MIGRAINE FOR UP TO 3 DOSES PER DAY; MAY REPEAT IN 2 HOURS AS NEEDED 10 tablet 6     No current facility-administered medications on file prior to visit.      Allergies   Allergen Reactions   • Penicillins Hives          Review of Systems   Constitutional:  Negative for activity change, appetite change, chills, diaphoresis, fatigue, fever and unexpected weight change.   HENT:  Negative for congestion, dental problem, drooling, ear discharge, ear pain, facial swelling, hearing loss, mouth sores, nosebleeds, postnasal drip, rhinorrhea, sinus pressure, sneezing, sore throat, tinnitus, trouble swallowing and voice change.    Eyes:  Negative for photophobia, pain, discharge, redness, itching and visual disturbance.   Respiratory:  Negative for apnea, cough, choking, chest tightness, shortness of breath, wheezing and stridor.    Cardiovascular:  Negative for chest pain, palpitations and leg swelling.   Gastrointestinal:  Negative for abdominal distention, abdominal pain, anal bleeding, blood in stool, constipation, diarrhea, nausea, rectal pain and  "vomiting.   Endocrine: Negative for cold intolerance, heat intolerance, polydipsia, polyphagia and polyuria.   Genitourinary:  Negative for decreased urine volume, difficulty urinating, dysuria, enuresis, flank pain, frequency, genital sores, hematuria and urgency.   Musculoskeletal:  Positive for arthralgias. Negative for back pain, gait problem, joint swelling, myalgias, neck pain and neck stiffness.   Skin:  Negative for color change, pallor, rash and wound.   Allergic/Immunologic: Negative for environmental allergies, food allergies and immunocompromised state.   Neurological:  Negative for dizziness, tremors, seizures, syncope, facial asymmetry, speech difficulty, weakness, light-headedness, numbness and headaches.   Hematological:  Negative for adenopathy. Does not bruise/bleed easily.   Psychiatric/Behavioral:  Negative for agitation, behavioral problems, confusion, decreased concentration, dysphoric mood, hallucinations, self-injury, sleep disturbance and suicidal ideas. The patient is not nervous/anxious and is not hyperactive.         I reviewed the patient's chief complaint, history of present illness, review of systems, past medical history, surgical history, family history, social history, medications and allergy list.        Objective      Physical Exam  /72   Ht 165.1 cm (65\")   Wt 106 kg (232 lb 9.6 oz)   LMP 01/06/2021   BMI 38.71 kg/m²     Body mass index is 38.71 kg/m².    General  Mental Status - alert  General Appearance - cooperative, well groomed, not in acute distress  Orientation - Oriented X3  Build & Nutrition - well developed and well nourished  Posture - normal posture  Gait - normal gait       Ortho Exam  Left shoulder lacks about 20 degrees of full forward flexion abduction.  She also lacks internal rotation.  Positive impingement.    Imaging/Studies Reviewed and Interpreted:  Imaging Results (Last 24 Hours)       Procedure Component Value Units Date/Time    XR Shoulder 2+ " View Left [[id number]] Resulted: 05/03/24 0834     Updated: 05/03/24 0835    Narrative:      Left Shoulder X-Ray  Indication: Pain  AP, scapular Y, and axillary lateral views    Findings:  No fracture  No bony lesion  Normal soft tissues  Normal joint spaces    No prior studies were available for comparison.              Assessment    Assessment:  1. Left shoulder pain, unspecified chronicity    2. Adhesive capsulitis of left shoulder    3. Prediabetes    4. Numbness and tingling in left arm        Plan:  Continue over-the-counter medication as needed for discomfort  I injected her left shoulder glenohumeral joint with cortisone.  I discussed with the patient the potential benefits of performing a therapeutic injection of the cortisone as well as potential risks including but not limited to infection, swelling, pain, bleeding, bruising, nerve/vessel damage, skin color changes, transient elevation in blood glucose levels, and fat atrophy. After informed consent and verifying correct patient, procedure site, and type of procedure, the area was prepped with Hibiclens, ethyl chloride was used to numb the skin. The patient tolerated the procedure well. There were no complications.  She is aware the cortisone injection will temporarily increase her blood sugar  I have ordered physical therapy.  [facility] [place].  She will follow-up in 1 month.  We will observe the numbness and tingling in the left arm and if it does not improve it may require further workup        Tk Franks MD  05/03/24  08:41 EDT      Dictated Utilizing Dragon Dictation.

## 2024-05-03 NOTE — PROGRESS NOTES
Procedure   - Large Joint Arthrocentesis: L glenohumeral on 5/3/2024 8:41 AM  Indications: pain  Details: 21 G needle, posterior approach  Medications: 1 mL triamcinolone acetonide 40 MG/ML; 4 mL lidocaine PF 1% 1 %; 4 mL bupivacaine (PF) 0.25 %  Outcome: tolerated well, no immediate complications  Procedure, treatment alternatives, risks and benefits explained, specific risks discussed. Consent was given by the patient. Immediately prior to procedure a time out was called to verify the correct patient, procedure, equipment, support staff and site/side marked as required. Patient was prepped and draped in the usual sterile fashion.

## 2024-05-16 ENCOUNTER — SPECIALTY PHARMACY (OUTPATIENT)
Dept: GENERAL RADIOLOGY | Facility: HOSPITAL | Age: 49
End: 2024-05-16
Payer: COMMERCIAL

## 2024-05-16 NOTE — PROGRESS NOTES
Specialty Pharmacy Refill Coordination Note     Moira is a 48 y.o. female contacted today regarding refills of Ajovy due every 28 days (next 5/21) specialty medication(s).    Reviewed and verified with patient:       Specialty medication(s) and dose(s) confirmed: yes    Refill Questions      Flowsheet Row Most Recent Value   Changes to allergies? No   Changes to medications? No   New conditions or infections since last clinic visit No   Unplanned office visit, urgent care, ED, or hospital admission in the last 4 weeks  No   How does patient/caregiver feel medication is working? Very good   Financial problems or insurance changes  No   Since the previous refill, were any specialty medication doses or scheduled injections missed or delayed?  No  [Ajovy appears to be delayed because we switched from Qulipta to Ajovy and she waited to take Ajovy injection after she was out of Qulipta tablets (took toward end of april and now next ajovy injection is due 5/21)]   Does this patient require a clinical escalation to a pharmacist? No            Delivery Questions      Flowsheet Row Most Recent Value   Delivery method FedEx   Delivery address verified with patient/caregiver? Yes   Delivery address Home   Number of medications in delivery 1   Medication(s) being filled and delivered Fremanezumab-Vfrm   Doses left of specialty medications ajovy due 5/21 (took last one towards end of april--finished off qulipta rx first)   Copay verified? Yes   Copay amount $15 insurance/copay card   Copay form of payment Credit/debit on file                   Follow-up: 1 month(s)     Radha Cho, MylesD

## 2024-06-12 ENCOUNTER — OFFICE VISIT (OUTPATIENT)
Dept: ORTHOPEDIC SURGERY | Facility: CLINIC | Age: 49
End: 2024-06-12
Payer: COMMERCIAL

## 2024-06-12 ENCOUNTER — SPECIALTY PHARMACY (OUTPATIENT)
Dept: NEUROLOGY | Facility: CLINIC | Age: 49
End: 2024-06-12
Payer: COMMERCIAL

## 2024-06-12 VITALS
DIASTOLIC BLOOD PRESSURE: 78 MMHG | SYSTOLIC BLOOD PRESSURE: 134 MMHG | HEIGHT: 65 IN | WEIGHT: 229.28 LBS | BODY MASS INDEX: 38.2 KG/M2

## 2024-06-12 DIAGNOSIS — M75.02 ADHESIVE CAPSULITIS OF LEFT SHOULDER: Primary | ICD-10-CM

## 2024-06-12 PROCEDURE — 99213 OFFICE O/P EST LOW 20 MIN: CPT | Performed by: ORTHOPAEDIC SURGERY

## 2024-06-12 NOTE — PROGRESS NOTES
"    [facility] [place] [facility] Clinic Note        Subjective     CC: Follow-up (4 week follow up --Left shoulder pain)      HPI    Moira Cardoso is a 48 y.o. female.  I injected her left shoulder on May 3.  She is not sure it helped much.  Physical therapy is helping.  She has a history of frozen shoulder in the past. Over the past 2 months she has increased pain and stiffness of left shoulder. She says she has prediabetes. She works in . No recent treatment. She thinks she has a frozen shoulder and she is referred here for treatment. She has intermittent symptoms of numbness and tingling down to the hand.   Overall, patient's symptoms are improving.    ROS:    Constiutional:Pt denies fever, chills, nausea, or vomiting.  MSK:as above        Objective      Past Medical History  Past Medical History:   Diagnosis Date   • Abnormal Pap smear of cervix     30+ years ago   • Class 2 obesity due to excess calories without serious comorbidity with body mass index (BMI) of 38.0 to 38.9 in adult    • Endometriosis     diagnosed during c/s   • Frozen shoulder    • Migraine      Social History     Socioeconomic History   • Marital status:    Tobacco Use   • Smoking status: Never     Passive exposure: Never   • Smokeless tobacco: Never   Vaping Use   • Vaping status: Never Used   Substance and Sexual Activity   • Alcohol use: Yes     Comment: occasionally   • Drug use: No   • Sexual activity: Yes     Partners: Male     Birth control/protection: Post-menopausal          Physical Exam  /78   Ht 165.1 cm (65\")   Wt 104 kg (229 lb 4.5 oz)   LMP 01/06/2021   BMI 38.15 kg/m²     Body mass index is 38.15 kg/m².    Patient is well nourished and well developed.        Ortho Exam  Left shoulder with improved forward flexion and abduction.  Near full.    Imaging/Labs/EMG Reviewed and Interpreted:  Imaging Results (Last 24 Hours)       ** No results found for the last 24 hours. **              Assessment  "   Assessment:  1. Adhesive capsulitis of left shoulder        Plan:  Recommend over the counter anti-inflammatories for pain and/or swelling  She will continue physical therapy at [facility].  Follow-up in 6 weeks.  She is getting better      kT Franks MD  06/12/24  14:01 EDT      Dictated Utilizing Dragon Dictation.

## 2024-06-12 NOTE — PROGRESS NOTES
Specialty Pharmacy Refill Coordination Note     Moira is a 48 y.o. female contacted today regarding refills of Ajovy specialty medication(s). Patient due for next injection on 6/20/24.    Reviewed and verified with patient:       Specialty medication(s) and dose(s) confirmed: yes    Refill Questions      Flowsheet Row Most Recent Value   Changes to allergies? No   Changes to medications? No   New conditions or infections since last clinic visit No   Unplanned office visit, urgent care, ED, or hospital admission in the last 4 weeks  No   How does patient/caregiver feel medication is working? Very good   Financial problems or insurance changes  No   Since the previous refill, were any specialty medication doses or scheduled injections missed or delayed?  No   Does this patient require a clinical escalation to a pharmacist? No            Delivery Questions      Flowsheet Row Most Recent Value   Delivery method FedEx   Delivery address verified with patient/caregiver? Yes   Delivery address Home   Number of medications in delivery 1   Medication(s) being filled and delivered Fremanezumab-Vfrm   Doses left of specialty medications 0   Copay verified? Yes   Copay amount 15.00   Copay form of payment Credit/debit on file                   Follow-up: 28 day(s)     Amirah Hamilton, Pharmacy Technician  Specialty Pharmacy Technician

## 2024-07-10 ENCOUNTER — SPECIALTY PHARMACY (OUTPATIENT)
Dept: NEUROLOGY | Facility: CLINIC | Age: 49
End: 2024-07-10
Payer: COMMERCIAL

## 2024-07-10 NOTE — PROGRESS NOTES
Specialty Pharmacy Refill Coordination Note     Moira is a 48 y.o. female contacted today regarding refills of Ajovy specialty medication(s). Patient is due for next injection on 7/21/24.    Reviewed and verified with patient:       Specialty medication(s) and dose(s) confirmed: yes    Refill Questions      Flowsheet Row Most Recent Value   Changes to allergies? No   Changes to medications? No   New conditions or infections since last clinic visit No   Unplanned office visit, urgent care, ED, or hospital admission in the last 4 weeks  No   How does patient/caregiver feel medication is working? Very good   Financial problems or insurance changes  No   Since the previous refill, were any specialty medication doses or scheduled injections missed or delayed?  No   Does this patient require a clinical escalation to a pharmacist? No            Delivery Questions      Flowsheet Row Most Recent Value   Delivery method FedEx   Delivery address verified with patient/caregiver? Yes   Delivery address Home   Number of medications in delivery 1   Medication(s) being filled and delivered Fremanezumab-Vfrm   Doses left of specialty medications 0   Copay verified? Yes   Copay amount 15.00   Copay form of payment Credit/debit on file   Ship Date 7/10   Delivery Date 7/11   Signature Required No                   Follow-up: 28 day(s)     Amirah Hamilton, Pharmacy Technician  Specialty Pharmacy Technician

## 2024-07-23 ENCOUNTER — OFFICE VISIT (OUTPATIENT)
Dept: INTERNAL MEDICINE | Facility: CLINIC | Age: 49
End: 2024-07-23
Payer: COMMERCIAL

## 2024-07-23 VITALS
BODY MASS INDEX: 40.25 KG/M2 | HEIGHT: 65 IN | SYSTOLIC BLOOD PRESSURE: 128 MMHG | DIASTOLIC BLOOD PRESSURE: 86 MMHG | WEIGHT: 241.6 LBS | OXYGEN SATURATION: 97 % | HEART RATE: 93 BPM

## 2024-07-23 DIAGNOSIS — Z00.00 ENCOUNTER FOR WELL ADULT EXAM WITHOUT ABNORMAL FINDINGS: ICD-10-CM

## 2024-07-23 DIAGNOSIS — G43.019 INTRACTABLE MIGRAINE WITHOUT AURA AND WITHOUT STATUS MIGRAINOSUS: ICD-10-CM

## 2024-07-23 DIAGNOSIS — Z00.00 ANNUAL PHYSICAL EXAM: Primary | ICD-10-CM

## 2024-07-23 DIAGNOSIS — R68.84 JAW PAIN: ICD-10-CM

## 2024-07-23 DIAGNOSIS — Z78.9 NON-SMOKER: ICD-10-CM

## 2024-07-23 DIAGNOSIS — E66.01 CLASS 3 SEVERE OBESITY DUE TO EXCESS CALORIES WITH SERIOUS COMORBIDITY AND BODY MASS INDEX (BMI) OF 40.0 TO 44.9 IN ADULT: ICD-10-CM

## 2024-07-23 DIAGNOSIS — Z13.220 SCREENING FOR LIPID DISORDERS: ICD-10-CM

## 2024-07-23 DIAGNOSIS — Z13.31 DEPRESSION SCREEN: ICD-10-CM

## 2024-07-23 DIAGNOSIS — K21.9 GASTROESOPHAGEAL REFLUX DISEASE, UNSPECIFIED WHETHER ESOPHAGITIS PRESENT: ICD-10-CM

## 2024-07-23 DIAGNOSIS — Z97.3 WEARS GLASSES: ICD-10-CM

## 2024-07-23 DIAGNOSIS — Z71.3 ENCOUNTER FOR DIETARY COUNSELING AND SURVEILLANCE: ICD-10-CM

## 2024-07-23 DIAGNOSIS — R73.09 ELEVATED HEMOGLOBIN A1C: ICD-10-CM

## 2024-07-23 DIAGNOSIS — Z78.9 NO SIGNIFICANT FAMILY HISTORY: ICD-10-CM

## 2024-07-23 DIAGNOSIS — M75.02 ADHESIVE CAPSULITIS OF LEFT SHOULDER: ICD-10-CM

## 2024-07-23 PROCEDURE — 3079F DIAST BP 80-89 MM HG: CPT | Performed by: NURSE PRACTITIONER

## 2024-07-23 PROCEDURE — 3074F SYST BP LT 130 MM HG: CPT | Performed by: NURSE PRACTITIONER

## 2024-07-23 PROCEDURE — 3044F HG A1C LEVEL LT 7.0%: CPT | Performed by: NURSE PRACTITIONER

## 2024-07-23 PROCEDURE — 1159F MED LIST DOCD IN RCRD: CPT | Performed by: NURSE PRACTITIONER

## 2024-07-23 PROCEDURE — 1160F RVW MEDS BY RX/DR IN RCRD: CPT | Performed by: NURSE PRACTITIONER

## 2024-07-23 PROCEDURE — 96127 BRIEF EMOTIONAL/BEHAV ASSMT: CPT | Performed by: NURSE PRACTITIONER

## 2024-07-23 PROCEDURE — 99497 ADVNCD CARE PLAN 30 MIN: CPT | Performed by: NURSE PRACTITIONER

## 2024-07-23 PROCEDURE — 99396 PREV VISIT EST AGE 40-64: CPT | Performed by: NURSE PRACTITIONER

## 2024-07-23 RX ORDER — ESOMEPRAZOLE MAGNESIUM 40 MG/1
40 CAPSULE, DELAYED RELEASE ORAL
COMMUNITY
End: 2024-07-23

## 2024-07-23 NOTE — PROGRESS NOTES
Annual Physical     Name: Moira Cardoso    : 1975     MRN: [id number]     Chief Complaint  Annual Exam    Subjective     History of Present Illness:  Moira Cardoso is a 49 y.o. female who presents today for annual physical exam    Patient does currently follow with neurology for her migraines.  She is currently on the Ajovy and tolerating well.  She is scheduled to see this provider again in November.  She did receive a trial of Tegretol- mg as she was concern for possible trigeminal neuralgia.  -Patient states the trial of medication did not help.    [facility]  She does have a scheduled upcoming visit.    Mammogram completed May 2024  Colonoscopy completed 2021 with recommended 10-year repeat  Pap smear completed 2022    Patient is a non-smoker.  Occasional alcohol use.  No drug use    Patient denies any significant family history    Patient states since previous visit she has continued to struggle with his jaw pain.  She ended up not going to see a GI specialist but she has been taking Nexium which has helped her symptoms.  She did recently see a dentist and was prescribed a bite guard.  She was also started on a muscle relaxer.  She is now seeing physical therapy who thinks she may have some weaker neck muscles that could be contributing to her jaw pain.  She is also seeing the same physical therapist for her frozen shoulder    Patient is continuing to work towards healthy diet and lifestyle as well as weight loss.  They are redoing her kitchen so she has eaten out quite a bit so she has increased her weight      The patient is being seen for a health maintenance evaluation.    Past Medical History:   Diagnosis Date    Abnormal Pap smear of cervix     30+ years ago    Class 2 obesity due to excess calories without serious comorbidity with body mass index (BMI) of 38.0 to 38.9 in adult     Endometriosis     diagnosed during c/s     "Frozen shoulder     Migraine        Past Surgical History:   Procedure Laterality Date     SECTION      DENTAL PROCEDURE         Social History     Socioeconomic History    Marital status:    Tobacco Use    Smoking status: Never     Passive exposure: Never    Smokeless tobacco: Never   Vaping Use    Vaping status: Never Used   Substance and Sexual Activity    Alcohol use: Yes     Comment: occasionally    Drug use: No    Sexual activity: Yes     Partners: Male     Birth control/protection: Post-menopausal         Current Outpatient Medications:     Cholecalciferol 25 MCG (1000 UT) tablet, Take 1 tablet by mouth Daily. 2000 iu daily, Disp: , Rfl:     esomeprazole (nexIUM) 20 MG capsule, Take 1 capsule by mouth Every Morning Before Breakfast., Disp: , Rfl:     Fremanezumab-vfrm (Ajovy) 225 MG/1.5ML solution auto-injector, Inject 225 mg under the skin into the appropriate area as directed Every 30 (Thirty) Days., Disp: 1.5 mL, Rfl: 11    rizatriptan (MAXALT) 10 MG tablet, TAKE ONE TABLET BY MOUTH AS ONE DOSE AS NEEDED FOR MIGRAINE FOR UP TO 3 DOSES PER DAY; MAY REPEAT IN 2 HOURS AS NEEDED, Disp: 10 tablet, Rfl: 6    General History  Moira  does have regular dental visits.  She does complain of vision problems. Last eye exam was yearly.  Immunizations are up to date. The patient needs the following immunizations: up to date    Lifestyle  Moira  consumes in general, a \"healthy\" diet  .  She exercises intermittently.    Reproductive Health  Moira  is postmenopausal.  She reports periods are rare. Has not had for years. Few months ago with brown spotting  She is sexually active. Her contraceptive plan is no method.    Screening  Last pap was 2022  Last Completed Pap Smear            PAP SMEAR (Every 3 Years) Next due on 8/3/2025      2022  LIQUID-BASED PAP SMEAR, [facility] ([facility])    2020  Done - negative- HPV non 16/18 negative                . History of abnormal pap smear or " family history of gyn cancer: none stated      Last mammogram was 5/2024  Last Completed Mammogram            MAMMOGRAM (Yearly) Next due on 5/2/2025 05/02/2024  Mammo Screening Digital Tomosynthesis Bilateral With CAD    04/29/2023  Mammo Screening Digital Tomosynthesis Bilateral With CAD    04/20/2022  Mammo Screening Digital Tomosynthesis Bilateral With CAD    04/19/2021  Mammo Screening Digital Tomosynthesis Bilateral With CAD    01/17/2020  Mammo Screening Digital Tomosynthesis Bilateral With CAD    Only the first 5 history entries have been loaded, but more history exists.                . Personal or family history of abnormal mammograms or breast cancer: none stated      Last colonoscopy was 2021. 10 year repeat  Last Completed Colonoscopy            COLORECTAL CANCER SCREENING (COLONOSCOPY - Every 10 Years) Next due on 9/15/2031      09/15/2021  SCANNED - COLONOSCOPY                . Family history of colon cancer: none stated      Last DEXA was never.    Advance Care Planning   ACP discussion was held with the patient during this visit. Patient has an advance directive (not in EMR), copy requested.  16 minutes spent with patient today.  She does understand the importance of this documentation.  Patient will provide copy at her leisure       Health Maintenance Summary            INFLUENZA VACCINE (Yearly - August to March) Next due on 8/1/2024      10/26/2023  Imm Admin: Flublok 18+yrs    10/27/2021  Imm Admin: Flublok 18+yrs    10/30/2020  Imm Admin: Flublock Quad =>18yrs    10/16/2019  Imm Admin: Fluzone (or Fluarix & Flulaval for VFC) >6mos    10/02/2018  Imm Admin: Influenza Injectable Mdck Pf Quad    Only the first 5 history entries have been loaded, but more history exists.              BMI FOLLOWUP (Yearly) Next due on 1/12/2025 01/12/2024  Registry Metric: BMI Follow-up              MAMMOGRAM (Yearly) Next due on 5/2/2025 05/02/2024  Mammo Screening Digital Tomosynthesis Bilateral  With CAD    04/29/2023  Mammo Screening Digital Tomosynthesis Bilateral With CAD    04/20/2022  Mammo Screening Digital Tomosynthesis Bilateral With CAD    04/19/2021  Mammo Screening Digital Tomosynthesis Bilateral With CAD    01/17/2020  Mammo Screening Digital Tomosynthesis Bilateral With CAD    Only the first 5 history entries have been loaded, but more history exists.              ANNUAL PHYSICAL (Yearly) Next due on 7/23/2025 07/23/2024  Done              PAP SMEAR (Every 3 Years) Next due on 8/3/2025      08/03/2022  LIQUID-BASED PAP SMEAR, [facility] ([facility])    07/08/2020  Done - negative- HPV non 16/18 negative              COLORECTAL CANCER SCREENING (COLONOSCOPY - Every 10 Years) Next due on 9/15/2031      09/15/2021  SCANNED - COLONOSCOPY              TDAP/TD VACCINES (2 - Td or Tdap) Next due on 1/12/2034 01/12/2024  Imm Admin: Tdap              HEPATITIS C SCREENING  Completed      08/10/2023  Hep C Virus Ab component of Hepatitis C Antibody              ZOSTER VACCINE (Series Information) Completed      04/19/2024  Imm Admin: Shingrix    11/17/2023  Imm Admin: Shingrix              Pneumococcal Vaccine 0-64 (Series Information) Aged Out      No completion, postpone, or frequency change history exists for this topic.              Discontinued - COVID-19 Vaccine  Discontinued      01/12/2024  Frequency changed to Never by Pretty Willams APRN (declined)    02/12/2021  Imm Admin: COVID-19 (PFIZER) Purple Cap Monovalent    01/21/2021  Imm Admin: COVID-19 (PFIZER) Purple Cap Monovalent                  Immunization History   Administered Date(s) Administered    COVID-19 (PFIZER) Purple Cap Monovalent 01/21/2021, 02/12/2021    Flublock Quad =>18yrs 10/30/2020    Flublok 18+yrs 10/27/2021, 10/26/2023    Fluzone (or Fluarix & Flulaval for VFC) >6mos 10/16/2019    Influenza Injectable Mdck Pf Quad 10/02/2018    Shingrix 11/17/2023, 04/19/2024    Tdap 01/12/2024           Objective     Vital  "Signs  /86   Pulse 93   Ht 165.1 cm (65\")   Wt 110 kg (241 lb 9.6 oz)   SpO2 97%   BMI 40.20 kg/m²   Estimated body mass index is 40.2 kg/m² as calculated from the following:    Height as of this encounter: 165.1 cm (65\").    Weight as of this encounter: 110 kg (241 lb 9.6 oz).            PHQ-9 Depression Screening  Little interest or pleasure in doing things? 0-->not at all   Feeling down, depressed, or hopeless? 0-->not at all   Trouble falling or staying asleep, or sleeping too much?     Feeling tired or having little energy?     Poor appetite or overeating?     Feeling bad about yourself - or that you are a failure or have let yourself or your family down?     Trouble concentrating on things, such as reading the newspaper or watching television?     Moving or speaking so slowly that other people could have noticed? Or the opposite - being so fidgety or restless that you have been moving around a lot more than usual?     Thoughts that you would be better off dead, or of hurting yourself in some way?     PHQ-9 Total Score 0   If you checked off any problems, how difficult have these problems made it for you to do your work, take care of things at home, or get along with other people?       PHQ-9 Total Score: 0         Physical Exam  Vitals and nursing note reviewed.   Constitutional:       General: She is awake.      Appearance: Normal appearance. She is well-groomed. She is obese.   HENT:      Head: Normocephalic and atraumatic.   Eyes:      Extraocular Movements: Extraocular movements intact.      Pupils: Pupils are equal, round, and reactive to light.      Comments: Glasses in place   Cardiovascular:      Rate and Rhythm: Normal rate and regular rhythm.      Pulses: Normal pulses.           Radial pulses are 2+ on the right side and 2+ on the left side.        Posterior tibial pulses are 2+ on the right side and 2+ on the left side.      Heart sounds: Normal heart sounds, S1 normal and S2 normal. "   Pulmonary:      Effort: Pulmonary effort is normal.      Breath sounds: Normal breath sounds.   Abdominal:      General: Bowel sounds are normal.      Palpations: Abdomen is soft.   Musculoskeletal:         General: Normal range of motion.      Right lower leg: No edema.      Left lower leg: No edema.   Skin:     General: Skin is warm and dry.   Neurological:      Mental Status: She is alert and oriented to person, place, and time.      Comments: Mental status fully intact as patient was able to provide a detailed description of the events   Psychiatric:         Mood and Affect: Mood normal.         Behavior: Behavior normal. Behavior is cooperative.         Thought Content: Thought content normal.         Judgment: Judgment normal.                   Assessment and Plan     Diagnoses and all orders for this visit:    1. Annual physical exam (Primary)    2. Encounter for well adult exam without abnormal findings    3. Intractable migraine without aura and without status migrainosus    4. Elevated hemoglobin A1c  -     CBC (No Diff); Future  -     Comprehensive Metabolic Panel; Future  -     Urinalysis With Culture If Indicated -; Future  -     Hemoglobin A1c; Future    5. Screening for lipid disorders  -     Lipid Panel; Future    6. Non-smoker    7. No significant family history    8. Jaw pain    9. Gastroesophageal reflux disease, unspecified whether esophagitis present    10. Adhesive capsulitis of left shoulder    11. Wears glasses    12. Class 3 severe obesity due to excess calories with serious comorbidity and body mass index (BMI) of 40.0 to 44.9 in adult    13. Encounter for dietary counseling and surveillance    14. Depression screen    Plan  Annual physical exam completed with patient today    She will continue to closely follow with neurology and women's health    Labs are ordered and will be obtained when patient is fasting.  Previous elevated A1c    Continue with non-smoking status and working towards  healthy lifestyle    Continue to closely follow with physical therapy regarding the jaw pain as well as dentistry    Depression screen completed with negative results    Go to ER if any condition worsens or severe    Patient and I both felt comfortable following up in 1 year for annual physical    Happy late birthday!    Follow Up  Return for 1 year for annual .    PANCHO Mack    Part of this note may be an electronic transcription/translation of spoken language to printed text using the Dragon Dictation System.

## 2024-08-14 ENCOUNTER — SPECIALTY PHARMACY (OUTPATIENT)
Dept: NEUROLOGY | Facility: CLINIC | Age: 49
End: 2024-08-14
Payer: COMMERCIAL

## 2024-08-14 NOTE — PROGRESS NOTES
Specialty Pharmacy Refill Coordination Note     Moira is a 49 y.o. female contacted today regarding refills of Ajovy specialty medication(s). Patient due for next injection on 8/18/24.     Reviewed and verified with patient:       Specialty medication(s) and dose(s) confirmed: yes    Refill Questions      Flowsheet Row Most Recent Value   Changes to allergies? No   Changes to medications? No   New conditions or infections since last clinic visit No   Unplanned office visit, urgent care, ED, or hospital admission in the last 4 weeks  No   How does patient/caregiver feel medication is working? Very good   Financial problems or insurance changes  No   Since the previous refill, were any specialty medication doses or scheduled injections missed or delayed?  No   Does this patient require a clinical escalation to a pharmacist? No            Delivery Questions      Flowsheet Row Most Recent Value   Delivery method FedEx   Delivery address verified with patient/caregiver? Yes   Delivery address Home   Number of medications in delivery 1   Medication(s) being filled and delivered Fremanezumab-Vfrm   Doses left of specialty medications 0   Copay verified? Yes   Copay amount 15.00   Copay form of payment Credit/debit on file   Ship Date 8/14   Delivery Date 8/15   Signature Required No                   Follow-up: 28 day(s)     Amirah Hamilton, Pharmacy Technician  Specialty Pharmacy Technician

## 2024-09-05 ENCOUNTER — SPECIALTY PHARMACY (OUTPATIENT)
Dept: GENERAL RADIOLOGY | Facility: HOSPITAL | Age: 49
End: 2024-09-05
Payer: COMMERCIAL

## 2024-09-05 NOTE — PROGRESS NOTES
Specialty Pharmacy Patient Management Program  Neurology Reassessment     Moira Cardoso is a 49 y.o. female with migraines seen by a Neurology provider and enrolled in the Neurology Patient Management program offered by [facility].  A follow-up outreach was conducted, including assessment of continued therapy appropriateness, medication adherence, and side effect incidence and management for Ajovy.     Changes to Insurance Coverage or Financial Support  No changes    RX [employer]  Ajovy Copay Card     Relevant Past Medical History and Comorbidities  Relevant medical history and concomitant health conditions were discussed with the patient. The patient's chart has been reviewed for relevant past medical history and comorbid health conditions and updated as necessary.   Past Medical History:   Diagnosis Date    Abnormal Pap smear of cervix     30+ years ago    Class 2 obesity due to excess calories without serious comorbidity with body mass index (BMI) of 38.0 to 38.9 in adult     Endometriosis     diagnosed during c/s    Frozen shoulder     Migraine      Social History     Socioeconomic History    Marital status:    Tobacco Use    Smoking status: Never     Passive exposure: Never    Smokeless tobacco: Never   Vaping Use    Vaping status: Never Used   Substance and Sexual Activity    Alcohol use: Yes     Comment: occasionally    Drug use: No    Sexual activity: Yes     Partners: Male     Birth control/protection: Post-menopausal     Problem list reviewed by Carrie Mariee [facility] on 9/5/2024 at  3:17 PM    Hospitalizations and Urgent Care Since Last Assessment  ED Visits, Admissions, or Hospitalizations: None   Urgent Office Visits: None     Allergies  Known allergies and reactions were discussed with the patient. The patient's chart has been reviewed for allergy information and updated as necessary.   Allergies   Allergen Reactions    Penicillins Hives     Allergies reviewed by  Carrie Mariee RPH on 9/5/2024 at  3:17 PM      Current Medication List  This medication list has been reviewed with the patient and evaluated for any interactions or necessary modifications/recommendations, and updated to include all prescription medications, OTC medications, and supplements the patient is currently taking.  This list reflects what is contained in the patient's profile, which has also been marked as reviewed to communicate to other providers it is the most up to date version of the patient's current medication therapy.     Current Outpatient Medications:     Cholecalciferol 25 MCG (1000 UT) tablet, Take 1 tablet by mouth Daily. 2000 iu daily, Disp: , Rfl:     esomeprazole (nexIUM) 20 MG capsule, Take 1 capsule by mouth Every Morning Before Breakfast., Disp: , Rfl:     Fremanezumab-vfrm (Ajovy) 225 MG/1.5ML solution auto-injector, Inject 225 mg under the skin into the appropriate area as directed Every 30 (Thirty) Days., Disp: 1.5 mL, Rfl: 11    rizatriptan (MAXALT) 10 MG tablet, TAKE ONE TABLET BY MOUTH AS ONE DOSE AS NEEDED FOR MIGRAINE FOR UP TO 3 DOSES PER DAY; MAY REPEAT IN 2 HOURS AS NEEDED, Disp: 10 tablet, Rfl: 6    Medicines reviewed by Carrie Mariee RPH on 9/5/2024 at  3:17 PM    Drug Interactions  None     Adverse Drug Reactions  Medication tolerability: Tolerating with no to minimal ADRs          Medication plan: Continue therapy with normal follow-up  Plan for ADR Management: Not required      Adherence, Self-Administration, and Current Therapy Problems  Adherence related patient's specialty therapy was discussed with the patient. The Adherence segment of this outreach has been reviewed and updated.   Adherence Questions  Linked Medication(s) Assessed: Fremanezumab-Vfrm  On average, how many doses/injections does the patient miss per month?: 0  What are the identified reasons for non-adherence or missed doses? : no problems identified  What is the estimated medication  adherence level?: 80-89%  Based on the patient/caregiver response and refill history, does this patient require an MTP to track adherence improvements?: no    Additional Barriers to Patient Self-Administration: None  Methods for Supporting Patient Self-Administration: Not required    Recently Close Medication Therapy Problems  No medication therapy recommendations to display  Open Medication Therapy Problems  No medication therapy recommendations to display     Goals of Therapy  Goals related to the patient's specialty therapy was discussed with the patient. The Patient Goals segment of this outreach has been reviewed and updated.    Goals Addressed Today        Specialty Pharmacy General Goal      Decrease frequency, severity and duration of migraine attacks from baseline. (~8 migraine days per month)                   Quality of Life Assessment   Quality of Life related to the patient's enrollment in the patient management program and services provided was discussed with the patient. The QOL segment of this outreach has been reviewed and updated.   Quality of Life Improvement Scale: 9-A good deal better    Reassessment Plan & Follow-Up  Medication Therapy Changes: No changes. Continue Ajovy 225 mg subcutaneously monthly.  Related Plans, Therapy Recommendations, or Issues to Be Addressed: Patient reports doing very well on Ajovy with no adverse effects. Ajovy therapy is appropriate to continue at this time.   Pharmacist to perform regular reassessments no more than (6) months from the previous assessment.  Care Coordinator to set up future refill outreaches, coordinate prescription delivery, and escalate clinical questions to pharmacist.     Attestation  Therapeutic appropriateness: Appropriate  I attest the patient was actively involved in and has agreed to the above plan of care. If the prescribed therapy is at any point deemed not appropriate based on the current or future assessments, a consultation will be  initiated with the patient's specialty care provider to determine the best course of action. The revised plan of therapy will be documented along with any additional patient education provided. Discussed aforementioned material with patient via telemedicine.    Myles YuD, SILVESTRE  Clinic Specialty Pharmacist, Neurology  9/5/2024  15:18 EDT

## 2024-09-06 NOTE — PROGRESS NOTES
Specialty Pharmacy Refill Coordination Note     Moira is a 49 y.o. female contacted today regarding refills of  Ajovy specialty medication(s).    Reviewed and verified with patient:  Allergies  Meds  Problems       Specialty medication(s) and dose(s) confirmed: yes    Refill Questions      Flowsheet Row Most Recent Value   Changes to allergies? No   Changes to medications? No   New conditions or infections since last clinic visit No   Unplanned office visit, urgent care, ED, or hospital admission in the last 4 weeks  No   How does patient/caregiver feel medication is working? Very good   Financial problems or insurance changes  No   Since the previous refill, were any specialty medication doses or scheduled injections missed or delayed?  No   Does this patient require a clinical escalation to a pharmacist? No            Delivery Questions      Flowsheet Row Most Recent Value   Delivery method UPS   Delivery address verified with patient/caregiver? Yes   Delivery address Home   Number of medications in delivery 1   Medication(s) being filled and delivered Fremanezumab-Vfrm   Doses left of specialty medications 0   Copay verified? Yes   Copay amount $0   Copay form of payment No copayment ($0)   Ship Date 9/9/24   Delivery Date 9/10/24   Signature Required No                   Follow-up: 28 day(s)     Carrie Mariee [facility]     no

## 2024-09-11 ENCOUNTER — LAB (OUTPATIENT)
Dept: LAB | Facility: HOSPITAL | Age: 49
End: 2024-09-11
Payer: COMMERCIAL

## 2024-09-11 DIAGNOSIS — Z13.220 SCREENING FOR LIPID DISORDERS: ICD-10-CM

## 2024-09-11 DIAGNOSIS — R73.09 ELEVATED HEMOGLOBIN A1C: ICD-10-CM

## 2024-09-11 LAB
ALBUMIN SERPL-MCNC: 4.2 G/DL (ref 3.5–5.2)
ALBUMIN/GLOB SERPL: 1.6 G/DL
ALP SERPL-CCNC: 82 U/L (ref 39–117)
ALT SERPL W P-5'-P-CCNC: 39 U/L (ref 1–33)
ANION GAP SERPL CALCULATED.3IONS-SCNC: 9 MMOL/L (ref 5–15)
AST SERPL-CCNC: 24 U/L (ref 1–32)
BILIRUB SERPL-MCNC: 0.3 MG/DL (ref 0–1.2)
BILIRUB UR QL STRIP: NEGATIVE
BUN SERPL-MCNC: 15 MG/DL (ref 6–20)
BUN/CREAT SERPL: 19.7 (ref 7–25)
CALCIUM SPEC-SCNC: 9.7 MG/DL (ref 8.6–10.5)
CHLORIDE SERPL-SCNC: 106 MMOL/L (ref 98–107)
CHOLEST SERPL-MCNC: 177 MG/DL (ref 0–200)
CLARITY UR: CLEAR
CO2 SERPL-SCNC: 27 MMOL/L (ref 22–29)
COLOR UR: YELLOW
CREAT SERPL-MCNC: 0.76 MG/DL (ref 0.57–1)
DEPRECATED RDW RBC AUTO: 38 FL (ref 37–54)
EGFRCR SERPLBLD CKD-EPI 2021: 95 ML/MIN/1.73
ERYTHROCYTE [DISTWIDTH] IN BLOOD BY AUTOMATED COUNT: 11.6 % (ref 12.3–15.4)
GLOBULIN UR ELPH-MCNC: 2.6 GM/DL
GLUCOSE SERPL-MCNC: 112 MG/DL (ref 65–99)
GLUCOSE UR STRIP-MCNC: NEGATIVE MG/DL
HBA1C MFR BLD: 5.9 % (ref 4.8–5.6)
HCT VFR BLD AUTO: 39.6 % (ref 34–46.6)
HDLC SERPL-MCNC: 56 MG/DL (ref 40–60)
HGB BLD-MCNC: 13.4 G/DL (ref 12–15.9)
HGB UR QL STRIP.AUTO: NEGATIVE
HOLD SPECIMEN: NORMAL
KETONES UR QL STRIP: NEGATIVE
LDLC SERPL CALC-MCNC: 106 MG/DL (ref 0–100)
LDLC/HDLC SERPL: 1.87 {RATIO}
LEUKOCYTE ESTERASE UR QL STRIP.AUTO: NEGATIVE
MCH RBC QN AUTO: 30.7 PG (ref 26.6–33)
MCHC RBC AUTO-ENTMCNC: 33.8 G/DL (ref 31.5–35.7)
MCV RBC AUTO: 90.6 FL (ref 79–97)
NITRITE UR QL STRIP: NEGATIVE
PH UR STRIP.AUTO: 6.5 [PH] (ref 5–8)
PLATELET # BLD AUTO: 274 10*3/MM3 (ref 140–450)
PMV BLD AUTO: 10.1 FL (ref 6–12)
POTASSIUM SERPL-SCNC: 4.4 MMOL/L (ref 3.5–5.2)
PROT SERPL-MCNC: 6.8 G/DL (ref 6–8.5)
PROT UR QL STRIP: ABNORMAL
RBC # BLD AUTO: 4.37 10*6/MM3 (ref 3.77–5.28)
SODIUM SERPL-SCNC: 142 MMOL/L (ref 136–145)
SP GR UR STRIP: 1.02 (ref 1–1.03)
TRIGL SERPL-MCNC: 82 MG/DL (ref 0–150)
UROBILINOGEN UR QL STRIP: ABNORMAL
VLDLC SERPL-MCNC: 15 MG/DL (ref 5–40)
WBC NRBC COR # BLD AUTO: 6.02 10*3/MM3 (ref 3.4–10.8)

## 2024-09-11 PROCEDURE — 85027 COMPLETE CBC AUTOMATED: CPT

## 2024-09-11 PROCEDURE — 81003 URINALYSIS AUTO W/O SCOPE: CPT

## 2024-09-11 PROCEDURE — 80053 COMPREHEN METABOLIC PANEL: CPT

## 2024-09-11 PROCEDURE — 83036 HEMOGLOBIN GLYCOSYLATED A1C: CPT

## 2024-09-11 PROCEDURE — 80061 LIPID PANEL: CPT

## 2024-09-13 ENCOUNTER — TELEPHONE (OUTPATIENT)
Dept: INTERNAL MEDICINE | Facility: CLINIC | Age: 49
End: 2024-09-13
Payer: COMMERCIAL

## 2024-09-13 NOTE — TELEPHONE ENCOUNTER
Name: Moira Cardoso      Relationship: Self      Best Callback Number: [phone]       HUB PROVIDED THE RELAY MESSAGE FROM THE OFFICE      PATIENT: VOICED UNDERSTANDING AND HAS NO FURTHER QUESTIONS AT THIS TIME    ADDITIONAL INFORMATION:

## 2024-09-13 NOTE — TELEPHONE ENCOUNTER
Called and left  for pt to return call to office.    OK for HUB to relay message from provider.        ----- Message from Pretty Willams sent at 9/13/2024  9:44 AM EDT -----  Please let patient know labs resulted.  Overall results are similar to previous.  One of your liver enzymes is just slightly elevated.  Your bad cholesterol is also just slightly elevated.  A1c is the same at previous which is 5.9.  Blood counts are also within normal limits.

## 2024-09-18 ENCOUNTER — OFFICE VISIT (OUTPATIENT)
Dept: OBSTETRICS AND GYNECOLOGY | Facility: CLINIC | Age: 49
End: 2024-09-18
Payer: COMMERCIAL

## 2024-09-18 VITALS
HEIGHT: 65 IN | BODY MASS INDEX: 40.45 KG/M2 | DIASTOLIC BLOOD PRESSURE: 90 MMHG | WEIGHT: 242.8 LBS | SYSTOLIC BLOOD PRESSURE: 132 MMHG

## 2024-09-18 DIAGNOSIS — N95.0 PMB (POSTMENOPAUSAL BLEEDING): ICD-10-CM

## 2024-09-18 DIAGNOSIS — Z01.419 WOMEN'S ANNUAL ROUTINE GYNECOLOGICAL EXAMINATION: Primary | ICD-10-CM

## 2024-09-18 DIAGNOSIS — E66.01 MORBID OBESITY WITH BMI OF 40.0-44.9, ADULT: ICD-10-CM

## 2024-09-18 DIAGNOSIS — Z12.39 ENCOUNTER FOR BREAST CANCER SCREENING USING NON-MAMMOGRAM MODALITY: ICD-10-CM

## 2024-09-18 DIAGNOSIS — N95.1 MENOPAUSAL SYMPTOMS: ICD-10-CM

## 2024-09-18 RX ORDER — CHLORAL HYDRATE 500 MG
CAPSULE ORAL
COMMUNITY

## 2024-09-19 ENCOUNTER — TELEPHONE (OUTPATIENT)
Dept: OBSTETRICS AND GYNECOLOGY | Facility: CLINIC | Age: 49
End: 2024-09-19

## 2024-09-19 LAB
ESTRADIOL SERPL-MCNC: <5 PG/ML
FSH SERPL-ACNC: 76.4 MIU/ML

## 2024-09-19 NOTE — TELEPHONE ENCOUNTER
Caller: Moira Cardoso    Relationship to patient: Self    Best call back number: [phone]    Chief complaint: PT IS GOING [place] NEEDS TO R/S GYN U/S AND F/U W/DR. VASQUEZ     Type of visit: GYN F/U AND U/S    Requested date:  M, WED, FRIDAYS [facility]ST    If rescheduling, when is the original appointment: 10-3     Additional notes:SHE WILL BE OUT OF STATE FROM 9[place]29[place]10-6

## 2024-10-07 ENCOUNTER — SPECIALTY PHARMACY (OUTPATIENT)
Dept: NEUROLOGY | Facility: CLINIC | Age: 49
End: 2024-10-07
Payer: COMMERCIAL

## 2024-10-07 NOTE — PROGRESS NOTES
Specialty Pharmacy Refill Coordination Note     Moira is a 49 y.o. female contacted today regarding refills of Ajovy specialty medication(s). Patient due for next injection on 10/19/24.    Reviewed and verified with patient:       Specialty medication(s) and dose(s) confirmed: yes    Refill Questions      Flowsheet Row Most Recent Value   Changes to allergies? No   Changes to medications? No   New conditions or infections since last clinic visit No   Unplanned office visit, urgent care, ED, or hospital admission in the last 4 weeks  No   How does patient/caregiver feel medication is working? Very good   Financial problems or insurance changes  No   Since the previous refill, were any specialty medication doses or scheduled injections missed or delayed?  No   Does this patient require a clinical escalation to a pharmacist? No            Delivery Questions      Flowsheet Row Most Recent Value   Delivery method UPS   Delivery address verified with patient/caregiver? Yes   Delivery address Home   Number of medications in delivery 1   Medication(s) being filled and delivered Fremanezumab-Vfrm   Doses left of specialty medications 0   Copay verified? Yes   Copay amount 0.00   Copay form of payment No copayment ($0)   Ship Date 10/8   Delivery Date 10/9   Signature Required No                   Follow-up: 28 day(s)     Amirah Hamilton, Pharmacy Technician  Specialty Pharmacy Technician

## 2024-10-24 ENCOUNTER — OFFICE VISIT (OUTPATIENT)
Dept: OBSTETRICS AND GYNECOLOGY | Facility: CLINIC | Age: 49
End: 2024-10-24
Payer: COMMERCIAL

## 2024-10-24 VITALS
SYSTOLIC BLOOD PRESSURE: 142 MMHG | DIASTOLIC BLOOD PRESSURE: 94 MMHG | WEIGHT: 244.4 LBS | BODY MASS INDEX: 40.72 KG/M2 | HEIGHT: 65 IN

## 2024-10-24 DIAGNOSIS — N95.0 PMB (POSTMENOPAUSAL BLEEDING): Primary | ICD-10-CM

## 2024-10-24 RX ORDER — IBUPROFEN 200 MG
200 TABLET ORAL EVERY 6 HOURS PRN
COMMUNITY

## 2024-10-24 NOTE — PROGRESS NOTES
"     Gynecologic Exam Note      Chief Complaint   Patient presents with    Follow-up     Postmenopausal bleeding       CC: Follow up PMB    Subjective   HPI  Moira Cardoso is a 49 y.o. female, , who presents for follow up of Postmenopausal Bleeding. Patient was last seen 24 by Dr. Bonilla with complaint of dark brown spotting for one day in .    Since the last visit, she has not had any bleeding. The patient reports additional symptoms of occasional pelvic \"twinges\" that might be associated with gastric things. The patient has been evaluated: No.      Patient's last menstrual period was 2021..      She does not use hormone replacement therapy. She thinks she might have had steroids for TMJ/inflammation around . She had not had any recent blood thinners. She had not had any vaccines.    Additional OB/GYN History   Last Pap :   Last Completed Pap Smear            PAP SMEAR (Every 3 Years) Next due on 8/3/2025      2022  LIQUID-BASED PAP SMEAR, [facility] ([facility])    2020  Done - negative- HPV non 16/18 negative                  History of abnormal Pap smear: yes - \"years ago\"; repeats normal  Tobacco Usage?: No     The additional following portions of the patient's history were reviewed and updated as appropriate: allergies, current medications, past family history, past medical history, past social history, past surgical history, and problem list.    Past Medical History:   Diagnosis Date    Abnormal Pap smear of cervix     30+ years ago    Class 2 obesity due to excess calories without serious comorbidity with body mass index (BMI) of 38.0 to 38.9 in adult     Endometriosis     diagnosed during c/s    Frozen shoulder     Migraine        Past Surgical History:   Procedure Laterality Date     SECTION      DENTAL PROCEDURE           Review of Systems   Constitutional: Negative.    HENT: Negative.     Eyes: Negative.    Respiratory: Negative.     Cardiovascular: " "Negative.    Gastrointestinal: Negative.    Endocrine: Negative.    Genitourinary: Negative.    Musculoskeletal: Negative.    Skin: Negative.    Allergic/Immunologic: Negative.    Neurological: Negative.    Hematological: Negative.    Psychiatric/Behavioral: Negative.         I have reviewed and agree with the HPI, ROS, and historical information as entered above. Gisel Bonilla MD    Objective   /94 (BP Location: Right arm, Patient Position: Sitting, Cuff Size: Large Adult)   Ht 165.1 cm (65\")   Wt 111 kg (244 lb 6.4 oz)   LMP 01/06/2021   BMI 40.67 kg/m²     Physical Exam  Constitutional:       Appearance: She is well-developed.   HENT:      Head: Normocephalic.   Eyes:      Conjunctiva/sclera: Conjunctivae normal.   Pulmonary:      Effort: Pulmonary effort is normal.   Psychiatric:         Behavior: Behavior normal.         Did the patient have u/s today? Yes.  Findings showed endometrial thickness of 3.8mm with trace fluid in the cervix.  I have personally evaluated the U/S and agree with the findings. Gisel Bonilla MD    Assessment & Plan       U/S reviewed and endometrial lining is thin.  No adnexal masses seen.  She has had no further bleeding.  F/U annual or prn      Gisel Bonilla MD  10/24/2024   "

## 2024-11-08 ENCOUNTER — OFFICE VISIT (OUTPATIENT)
Dept: NEUROLOGY | Facility: CLINIC | Age: 49
End: 2024-11-08
Payer: COMMERCIAL

## 2024-11-08 VITALS
RESPIRATION RATE: 16 BRPM | DIASTOLIC BLOOD PRESSURE: 86 MMHG | HEART RATE: 84 BPM | WEIGHT: 244 LBS | OXYGEN SATURATION: 97 % | SYSTOLIC BLOOD PRESSURE: 148 MMHG | BODY MASS INDEX: 40.65 KG/M2 | HEIGHT: 65 IN

## 2024-11-08 DIAGNOSIS — G43.019 INTRACTABLE MIGRAINE WITHOUT AURA AND WITHOUT STATUS MIGRAINOSUS: ICD-10-CM

## 2024-11-08 DIAGNOSIS — R68.84 JAW PAIN: Primary | ICD-10-CM

## 2024-11-08 PROCEDURE — 99214 OFFICE O/P EST MOD 30 MIN: CPT | Performed by: PSYCHIATRY & NEUROLOGY

## 2024-11-08 RX ORDER — INDOMETHACIN 25 MG/1
25 CAPSULE ORAL 2 TIMES DAILY WITH MEALS
Qty: 60 CAPSULE | Refills: 3 | Status: SHIPPED | OUTPATIENT
Start: 2024-11-08 | End: 2024-12-08

## 2024-11-08 RX ORDER — RIZATRIPTAN BENZOATE 10 MG/1
10 TABLET ORAL ONCE AS NEEDED
Qty: 12 TABLET | Refills: 6 | Status: SHIPPED | OUTPATIENT
Start: 2024-11-08

## 2024-11-08 NOTE — PROGRESS NOTES
Subjective:    CC: Moira Cardoso is in clinic today for follow up for      HPI:  9/19/2019: She is in clinic for regular follow-up.  Since her last visit, she has been taking Ajovy every month.  She is now done about 5 Ajovy injections.  She reports that the effect with Ajovy is as not as good as what it was with Aimovig.  With Aimovig, she had complete resolution of headaches but with Ajovy, she is getting 3-4 breakthrough headaches in a month.  Still better than more than 15 headaches that she was experiencing prior to starting CGRP blockers.  She takes rizatriptan or ibuprofen as needed as an abortive therapy and it works really well.  She denies any side effects with Ajovy use.  She used Aimovig for 6 months prior to Ajovy and then insurance denied it.    9/9/2020: She is in clinic for regular follow-up.  Since her last visit 1 year ago, she reports that in the last 3 to 4 months, migraine intensity and frequency is slightly worse.  She is now experiencing 5-6 breakthrough migraines increase from 2-3 breakthrough migraines.  She is on Ajovy monthly injections and has done it for 1 year now.  Prior to Ajovy, she was on Aimovig and it had helped her significantly in controlling migraines but unfortunately insurance denied it.  She currently takes Maxalt as needed as an abortive treatment and does report side effects with Maxalt use.    1/13/2021: She is in clinic for regular follow-up.  Since her last visit in September, insurance did not approve Emgality.  She did try Ajovy prior to trying Emgality for about 6 months but it did not really help.  She had an excellent response with Aimovig monthly injection.  In the past, she has tried and failed Topamax and propranolol.    11/9/2021: She is in clinic for regular follow-up.  Since her last visit in January 2021, Aimovig is now approved by insurance and she has been taking it every month.  She reports that she has had excellent response with Aimovig and has had  almost complete resolution of migraines.  Whenever she gets breakthrough migraine, she will use rizatriptan at the onset and it does work as an abortive treatment.    6/20/2022: She is in clinic for regular follow-up.  Since her last visit in November 2021, she reports that while he was reevaluated excellent control.  She was on Aimovig which was switched to Ajovy due to insurance.  Initially with Ajovy, migraines were little worse but with continued use of Ajovy, they have become better.  She reports that she has been migraine free for last several months and did not have to take Maxalt.  She is tolerating Ajovy well without any side effects as well.    3/20/2023: She is in clinic for regular follow-up.  Since her last visit in June 2022, she has been doing Ajovy once a month injection with excellent response.  She reports that she has had only few migraines since her last visit.  She uses Maxalt with naproxen and this combination works really well as an abortive treatment.  She is now postmenopausal and is wondering if stopping Ajovy would be a good idea and see if menopause will reduce intensity and frequency of migraines naturally.  In addition to this, she reports that she continues to have this bilateral jaw area tenderness and mild intensity pain throughout the day.  She wakes up with it and goes to bed with it.  On the last visit, she had complained of bilateral ear pain for which I had to send her to ENT and detailed ENT evaluation did not reveal any abnormalities but now this pain is shifted to jaw area.    10/20/2023: She is in clinic for regular follow-up.  Since her last visit in March 2023, due to insurance change, Ajovy had to be switched to Aimovig and then insurance denied Aimovig so she was started on Nurtec every other day about 6 to 8 weeks ago.  With Nurtec, she has not had good response.  She is reporting 8 migraine days in a month.  Maxalt is working well as an abortive treatment.  We will try  muscle relaxer for bilateral jaw pain but it made her really sleepy.  She reports that it is worse first thing in the morning and then she does experiences throughout the day as well.  She takes ibuprofen as needed which helps but then pain returns once the effect of ibuprofen wears off.    4/13/2024: She is in clinic for regular follow-up.  Since her last visit in October 2023, insurance did approve Ajovy so she has been on Ajovy for last 6 months.  She has had an excellent response and reports that she did not have to take any Maxalt in last 6 months.  However the most important concern is bilateral temple area pain, ache bilateral jaw pain and sometimes burning sensation involving the jaw and neck area bilaterally in front.  She has seen ENT and her dentist who felt that this was not related to TMJ and may be coming from GERD.  She takes Tylenol when it becomes really bad and it does help temporarily and then the symptoms returned.  She has taken ibuprofen as well.    11/8/2024: She is in clinic for regular follow-up.  Since her last visit 7 months ago, she reports that her migraines remain under excellent control with use of Ajovy once a month injection.  She reports 1-2 breakthrough migraines however she continues to have daily bilateral jaw and facial pain.  She has also recently seen [facility].  They recommended MRI brain for further evaluation.  No definitive diagnosis was made.  She reports that when she takes ibuprofen it helps temporarily and then the symptoms return.      The following portions of the patient's history were reviewed and updated as of 11/08/2024: allergies, social history, and problem list.       Current Outpatient Medications:     Cholecalciferol 25 MCG (1000 UT) tablet, Take 1 tablet by mouth Daily. 2000 iu daily, Disp: , Rfl:     esomeprazole (nexIUM) 20 MG capsule, Take 1 capsule by mouth Every Morning Before Breakfast., Disp: , Rfl:     Fremanezumab-vfrm (Ajovy) 225  "MG/1.5ML solution auto-injector, Inject 225 mg under the skin into the appropriate area as directed Every 30 (Thirty) Days., Disp: 1.5 mL, Rfl: 11    ibuprofen (ADVIL,MOTRIN) 200 MG tablet, Take 1 tablet by mouth Every 6 (Six) Hours As Needed for Mild Pain., Disp: , Rfl:     Omega-3 Fatty Acids (fish oil) 1000 MG capsule capsule, Take  by mouth Daily With Breakfast., Disp: , Rfl:     rizatriptan (MAXALT) 10 MG tablet, Take 1 tablet by mouth 1 (One) Time As Needed for Migraine for up to 1 dose. May repeat in 2 hours if needed, Disp: 12 tablet, Rfl: 6    indomethacin (INDOCIN) 25 MG capsule, Take 1 capsule by mouth 2 (Two) Times a Day With Meals for 30 days., Disp: 60 capsule, Rfl: 3   Past Medical History:   Diagnosis Date    Abnormal Pap smear of cervix     30+ years ago    Class 2 obesity due to excess calories without serious comorbidity with body mass index (BMI) of 38.0 to 38.9 in adult     Endometriosis     diagnosed during c/s    Frozen shoulder     Migraine       Past Surgical History:   Procedure Laterality Date     SECTION      DENTAL PROCEDURE        Family History   Problem Relation Age of Onset    Depression Mother     COPD Mother     Mental illness Mother         Depression    Transient ischemic attack Mother     Hypertension Mother     Sleep apnea Mother     Kidney failure Father         amyloidosis- dialysis    Pancreatic cancer Paternal Aunt     Arthritis Maternal Grandmother     Alzheimer's disease Maternal Grandmother     Osteoporosis Maternal Grandmother     Parkinsonism Paternal Grandfather     Breast cancer Neg Hx     Ovarian cancer Neg Hx     Uterine cancer Neg Hx     Colon cancer Neg Hx         Review of Systems  Objective:    /86 (BP Location: Left arm, Patient Position: Sitting, Cuff Size: Large Adult)   Pulse 84   Resp 16   Ht 165.1 cm (65\")   Wt 111 kg (244 lb)   LMP 2021   SpO2 97%   BMI 40.60 kg/m²     Neurology Exam:  General apperance: NAD.     Mental status: " Alert, awake and oriented to time place and person.    Language and Speech: No aphasia or dysarthria.    CN II to XII: Intact.    Opthalmoscopic Exam: No papilledema.    Motor:  Right UE muscle strength 5/5. Normal tone.     Left UE muscle strength 5/5. Normal tone.      Right LE muscle strength 5/5. Normal tone.     Left LE muscle strength 5/5. Normal tone.      Sensory: Normal light touch, vibration and pinprick sensation bilaterally.    DTRs: 2+ bilaterally.    Babinski: Negative bilaterally.    Co-ordination: Normal finger-to-nose, heel to shin B/L.    Rhomberg: Negative.    Gait: Normal.    Cardiovascular: Regular rate and rhythm without murmur, gallop or rub.    Assessment and Plan:  1. Jaw pain  2. Intractable migraine without aura and without status migrainosus  Migraines under good control with use of Ajovy once a month injection.  Current frequency is 1-2 breakthrough migraines in a month responding well to rizatriptan.  Continue with this combination.  She continues to have bilateral jaw pain and facial pain daily and it has been going on for several years now.  I am going to give her a trial of indomethacin 25 mg daily for a week then 25 mg twice daily and see how she does.  I have advised her to take it with meals and drink plenty of water while on indomethacin.  I would also like to order MRI of face and neck with and without contrast for further evaluation based on the response to indomethacin, further dose adjustment will be made.  Otherwise, I will see her back in clinic in 6 weeks for follow-up.    - MRI orbit face neck w wo contrast; Future  - rizatriptan (MAXALT) 10 MG tablet; Take 1 tablet by mouth 1 (One) Time As Needed for Migraine for up to 1 dose. May repeat in 2 hours if needed  Dispense: 12 tablet; Refill: 6       I spent 30 minutes in patient care: Reviewing records prior to the visit, entering orders and documentation and spent more than torres 50% of this time face-to-face in management,  instructions and education regarding above mentioned diagnosis and also on counseling and discussing about taking medication regularly, possible side effects with medication use, importance of good sleep hygiene, good hydration and regular exercise.    Return in about 6 weeks (around 12/20/2024).       Note to patient: The 21st Century Cures Act makes medical notes like these available to patients in the interest of transparency. However, be advised this is a medical document. It is intended as peer to peer communication. It is written in medical language and may contain abbreviations or verbiage that are unfamiliar. It may appear blunt or direct. Medical documents are intended to carry relevant information, facts as evident, and the clinical opinion of the physician.

## 2024-11-11 ENCOUNTER — SPECIALTY PHARMACY (OUTPATIENT)
Dept: NEUROLOGY | Facility: CLINIC | Age: 49
End: 2024-11-11
Payer: COMMERCIAL

## 2024-11-11 NOTE — PROGRESS NOTES
Specialty Pharmacy Refill Coordination Note     Moira is a 49 y.o. female contacted today regarding refills of  Ajovy specialty medication(s).    Reviewed and verified with patient:       Specialty medication(s) and dose(s) confirmed: yes    Refill Questions      Flowsheet Row Most Recent Value   Changes to allergies? No   Changes to medications? No   New conditions or infections since last clinic visit No   Unplanned office visit, urgent care, ED, or hospital admission in the last 4 weeks  No   How does patient/caregiver feel medication is working? Very good   Financial problems or insurance changes  No   Since the previous refill, were any specialty medication doses or scheduled injections missed or delayed?  No   Does this patient require a clinical escalation to a pharmacist? No            Delivery Questions      Flowsheet Row Most Recent Value   Delivery method UPS   Delivery address verified with patient/caregiver? Yes   Delivery address Home   Number of medications in delivery 1   Medication(s) being filled and delivered Fremanezumab-vfrm (Ajovy)   Doses left of specialty medications 0   Copay verified? Yes   Copay amount $0   Copay form of payment No copayment ($0)   Ship Date 11/11/24   Delivery Date 11/12/24   Signature Required No                   Follow-up: 28 day(s)     Carrie Mariee [facility]

## 2024-11-13 ENCOUNTER — TELEPHONE (OUTPATIENT)
Dept: NEUROLOGY | Facility: CLINIC | Age: 49
End: 2024-11-13

## 2024-11-13 NOTE — TELEPHONE ENCOUNTER
Provider: ARNALDO LOVE MD    Caller: Moira Cardoso    Relationship to Patient: Self    Phone Number: [phone]    Reason for Call: CALLING REGARDING THE INDOMETHACIN.  STATED THE MEDICATION IS WORKING WELL.  STATED SHE IS WANTING TO KNOW IF SHE CAN START TAKING THE 2 A DAY NOW?    When was the patient last seen: 11-8-24      PLEASE CALL & ADVISE

## 2024-11-14 NOTE — TELEPHONE ENCOUNTER
PATIENT RETURNED CALL - ADVISE NOTE FROM PROVIDER - PT ACKNOWLEDGES AND WILL BEGING INCREASED MEDICATION DOSE TODAY

## 2024-11-16 ENCOUNTER — HOSPITAL ENCOUNTER (OUTPATIENT)
Facility: HOSPITAL | Age: 49
Discharge: HOME | End: 2024-11-16
Payer: COMMERCIAL

## 2024-11-16 DIAGNOSIS — R68.84 JAW PAIN: ICD-10-CM

## 2024-11-16 PROCEDURE — 70543 MRI ORBT/FAC/NCK W/O &W/DYE: CPT

## 2024-11-16 PROCEDURE — A9577 INJ MULTIHANCE: HCPCS | Performed by: PSYCHIATRY & NEUROLOGY

## 2024-11-16 PROCEDURE — 25510000002 GADOBENATE DIMEGLUMINE 529 MG/ML SOLUTION: Performed by: PSYCHIATRY & NEUROLOGY

## 2024-11-16 RX ADMIN — GADOBENATE DIMEGLUMINE 20 ML: 529 INJECTION, SOLUTION INTRAVENOUS at 14:54

## 2024-11-20 ENCOUNTER — TELEPHONE (OUTPATIENT)
Dept: NEUROLOGY | Facility: CLINIC | Age: 49
End: 2024-11-20
Payer: COMMERCIAL

## 2024-11-20 NOTE — TELEPHONE ENCOUNTER
Caller: Moira Cardoso    Relationship: Self    Best call back number: [phone]    Caller requesting test results: PATIENT    What test was performed: MRI    When was the test performed: 11/16/24    Where was the test performed:  [facility]

## 2024-11-26 NOTE — PROGRESS NOTES
[facility]  [address]  [place] [address]   Office Note      Date: 2024  Patient Name: Moira Cardoso  MRN: [id number]  : 1975    Subjective     Chief Complaint  Obesity Management New Patient          Moira Cardoso presents to [facility] for obesity management.  Patient has a past medical history of mild hyperlipidemia, prediabetes, and elevated liver enzyme.    She has desire to be at a final weight of approximately 165-170 pounds.    Patient is uncertain if she is interested in pharmacology at the moment.  Reports preferring to focus mostly on lifestyle changes currently.  Denies previous use of weight loss medications but has used weight watchers in her past and is open to the idea of journaling.     Highest lifetime weight: 239.6 pounds. Today's weight is 109 kg (239 lb 9.6 oz) pounds.   Weight 5 years ago: 210  The patient is exercising with a FITT score of:    Frequency   Intensity Time Strength Training   [x]   0 None  [x]   0 None  [x]   0 None  [x]   0 None    []   1 (1-2x/week) []   1 (light) []   1 (<10 min) []   1 (1x/week)   []   2 (3-5x/week) []   2 (moderate) []   2 (10-20 min) []   2 (2x/week)   []   3 (daily)   []   3 (moderately hard)  []   4 (very hard) []   3 (20-30 min)  []   4 (>30 min) []   3 (3-4x/week)         The following seem to sabotage weight loss efforts:Lack of time for planning & self, comfort/stress eating, enjoyment of food, social events, skipping meals, eating late, specific cravings like carbohydrates, mindless eating (snacking while working or watching TV), eating too fast, boredom eating, convenience food (fast food), portion sizes, and too little protein    Review of Systems   Constitutional:  Negative for appetite change and fatigue.        Positive for weight gain   HENT:  Negative for trouble swallowing.         Negative for throat swelling   Eyes:  Negative for visual  "disturbance.   Respiratory:  Negative for shortness of breath and wheezing.         Negative for snoring   Cardiovascular:  Negative for chest pain, palpitations and leg swelling.   Gastrointestinal:  Negative for abdominal pain, constipation, diarrhea, GERD and indigestion.   Endocrine: Negative for cold intolerance, heat intolerance, polydipsia, polyphagia and polyuria.        Negative for loss of hair  Negative for hirsutism     Genitourinary:         Denies menstrual irregularities   Musculoskeletal:  Negative for arthralgias.        Denies exercise limitations  Denies chronic pain   Skin:  Negative for dry skin.        Negative for acne   Neurological:  Negative for light-headedness, headache and memory problem.        Negative for paresthesias   Psychiatric/Behavioral:  Negative for self-injury, sleep disturbance, suicidal ideas and depressed mood. The patient is not nervous/anxious.    All other systems reviewed and are negative.      PHQ-9 Total Score:           Objective   Body mass index is 40.49 kg/m².  Body composition analysis completed and showed:   %body fat: 50.1    Measurements (in inches)  Neck: 14  Chest: 44  Waist: 47  Hips: 50  Thighs: 44.25    Vital Signs:   /84 (BP Location: Right arm, Patient Position: Sitting)   Pulse 75   Ht 163.8 cm (64.5\")   Wt 109 kg (239 lb 9.6 oz)   BMI 40.49 kg/m²       Physical Exam  Constitutional:       Appearance: Normal appearance. She is obese.   Neck:      Thyroid: Thyromegaly (mild right side) present.   Cardiovascular:      Rate and Rhythm: Normal rate and regular rhythm.      Heart sounds: Normal heart sounds.   Pulmonary:      Effort: Pulmonary effort is normal. No respiratory distress.      Breath sounds: Normal breath sounds.   Skin:     General: Skin is warm and dry.   Neurological:      Mental Status: She is alert and oriented to person, place, and time.   Psychiatric:         Attention and Perception: Attention and perception normal.         " Mood and Affect: Mood normal.         Speech: Speech normal.         Behavior: Behavior normal.       Result Review :     Common labs          9/11/2024    08:38   Common Labs   Glucose 112    BUN 15    Creatinine 0.76    Sodium 142    Potassium 4.4    Chloride 106    Calcium 9.7    Albumin 4.2    Total Bilirubin 0.3    Alkaline Phosphatase 82    AST (SGOT) 24    ALT (SGPT) 39    WBC 6.02    Hemoglobin 13.4    Hematocrit 39.6    Platelets 274    Total Cholesterol 177    Triglycerides 82    HDL Cholesterol 56    LDL Cholesterol  106    Hemoglobin A1C 5.90                  Assessment / Plan       Diagnoses and all orders for this visit:    1. Obesity, Class III, BMI 40-49.9 (morbid obesity) (Primary)  Assessment & Plan:  Patient's (Body mass index is 40.49 kg/m².) indicates that they are morbidly/severely obese (BMI > 40 or > 35 with obesity - related health condition) with health conditions that include  pre-diabetes elevated LD  . Weight is newly identified. BMI  is above average; BMI management plan is completed. We discussed low calorie, low carb based diet program, portion control, increasing exercise, and an zach-based approach such as [employer] Pal or Lose It.   -- This is an initial visit patient was referred over by her OB/GYN.  --Body composition analysis was reviewed in short and long-term weight loss goals set up based on this information.  --Baritastic food journal set up in office today and calorie and macro nutrient goals set.  Patient advised that her #1 goal is just to start exploring what she is eating throughout the day and journaling it.  Encouraged that were looking to stay at or below calorie and carbohydrates goal.  Looking to stay at or above protein fiber and water goal.  Comfortable bring in journal to next office visit for review.  --Currently patient is not interested in medication.  We did look at medication eligibility sheet and it does appear to have coverage on injectable weight loss  medications as well as Qsymia.  We did discuss that the injectable medications could be a good option in the future as she is on the prediabetic range.  That we have lots of patients on no medications that are also very successful.  --Recent labs reviewed of note is mildly elevated LDL, A1c, and liver enzyme.  Additional labs and UDS ordered today and completed in office.  He is currently taking an over-the-counter vitamin D supplementation so we will repeat vitamin D levels.  --Based on binge eating disorder questionnaire, she is not qualified as diagnostic but does have strong binge eating tendencies.  Discussed it is often helpful to talk to behavioral health regarding this.  The future if would like a referral to behavioral health this is an option.    Orders:  -     Comprehensive Metabolic Panel  -     Insulin, Total  -     Urine Drug Screen - Urine, Clean Catch  -     Vitamin D,25-Hydroxy    2. Vitamin D deficiency  -     Vitamin D,25-Hydroxy    3. Thyroid enlarged  Comments:  Based on recent imaging, she has an enlarged thryoid. Milg right side enlargment noted on exam. Follow up with PCP or endocrilogy to discuss further.         Topics of discussion included obesity as a disease, nutritional education on food groups, exercise, and medications. Patient was instructed in adequate protein, controlled carb and controlled fat intake. Patient received instructions on using the medicines as a tool in controlling their weight with nutritional and behavioral changes. Risks and benefits were discussed. I believe the potential benefits of medication helping to decrease weight outweighs the risks. Patient is to try nutritonal/behavioral changes only first. Patient received our clinic education booklet.   Our patient consent form was reviewed including potential risks of weight loss. We also reviewed our confidentiality and HIPPA statements. Patients current FITT score was reviewed along with current capability for  exercise tolerance and a patient will work towards a FITT score of: Patient's past medical history was reviewed in detail and barriers to weight loss were identified and discussed. Past efforts at weight reduction on their own as well as under medical provider supervision were documented and discussed.  I advised patient to continue routine care with their Primary Care Provider. Nutritional recommendations and goals were reviewed based on body composition analysis including basal metabolic rate. Goal set for Calories,  adjusted for exercise calories burnt as well as Macronutrient. Take other medications and supplements as directed. Increase physical activity as tolerated without side effects.       I spent 60 minutes on this date of service. This time includes time spent by me in the following activities:preparing for the visit, counseling and educating the patient/family/caregiver, ordering medications, tests, or procedures and documenting information in the medical record.    Follow Up   Return in about 4 weeks (around 12/30/2024).  Patient was given instructions and counseling regarding her condition or for health maintenance advice. Please see specific information pulled into the AVS if appropriate.     Anneliese Barajas, APRN  12/02/2024

## 2024-11-27 ENCOUNTER — TELEPHONE (OUTPATIENT)
Dept: NEUROLOGY | Facility: CLINIC | Age: 49
End: 2024-11-27
Payer: COMMERCIAL

## 2024-11-27 ENCOUNTER — TELEPHONE (OUTPATIENT)
Dept: INTERNAL MEDICINE | Facility: CLINIC | Age: 49
End: 2024-11-27

## 2024-11-27 NOTE — TELEPHONE ENCOUNTER
"Relay     \"LVM with Moira and relayed results from MRI per Dr. Reed. Apologized for delay in getting back to her. I am sending a copy of the report to PCP office to see if any further steps need taken. Images will be available in chart for review as well, they are also in [facility] \"                "

## 2024-11-27 NOTE — TELEPHONE ENCOUNTER
----- Message from Ceasar Reed sent at 11/25/2024  1:45 PM EST -----  Please call the patient regarding her MRI.  It shows some mild nonspecific inflammation in muscles of the face on both the sides.  There is mild enlargement of the lymph nodes which are nonspecific.  There is some enlargement of the thyroid gland.  If she has low thyroid and continue with thyroid treatment as per schedule.

## 2024-11-27 NOTE — TELEPHONE ENCOUNTER
Caller: Moira Cardoso    Relationship: Self    Best call back number: [phone]     Caller requesting test results: PATIENT     What test was performed: ORBITAL MRI ORDERED BY DR. ARNALDO LOVE    When was the test performed: 11/16/24    Where was the test performed: [facility]    Additional notes: RESULTS SHOW NONSPECIFIC INFLAMMATION IN MUSCLES OF THE FACE ON BOTH SIDES, AS WELL AS MILD ENLARGEMENT OF THE LYMPH NODES WHICH ARE NON SPECIFIC. THERE IS ALSO SOME ENLARGEMENT OF THE THYROID GLAND. PATIENT WOULD LIKE PCP TO REVIEW THE RESULTS OF HER MRI AND ALSO LET HER KNOW IF SHE WILL NEED TO FOLLOW UP WITH PCP TO DISCUSS. PATIENT WOULD ALSO LIKE TO ASK IF SHE SHOULD BE REFERRED TO ENDOCRINOLOGY.

## 2024-12-02 ENCOUNTER — OFFICE VISIT (OUTPATIENT)
Dept: BARIATRICS/WEIGHT MGMT | Facility: CLINIC | Age: 49
End: 2024-12-02
Payer: COMMERCIAL

## 2024-12-02 VITALS
HEIGHT: 65 IN | SYSTOLIC BLOOD PRESSURE: 126 MMHG | BODY MASS INDEX: 39.92 KG/M2 | DIASTOLIC BLOOD PRESSURE: 84 MMHG | HEART RATE: 75 BPM | WEIGHT: 239.6 LBS

## 2024-12-02 DIAGNOSIS — E66.01 OBESITY, CLASS III, BMI 40-49.9 (MORBID OBESITY): Primary | ICD-10-CM

## 2024-12-02 DIAGNOSIS — E04.9 THYROID ENLARGED: ICD-10-CM

## 2024-12-02 DIAGNOSIS — E55.9 VITAMIN D DEFICIENCY: ICD-10-CM

## 2024-12-02 PROBLEM — E66.813 OBESITY, CLASS III, BMI 40-49.9 (MORBID OBESITY): Status: ACTIVE | Noted: 2024-12-02

## 2024-12-02 PROCEDURE — 99417 PROLNG OP E/M EACH 15 MIN: CPT | Performed by: NURSE PRACTITIONER

## 2024-12-02 PROCEDURE — 99215 OFFICE O/P EST HI 40 MIN: CPT | Performed by: NURSE PRACTITIONER

## 2024-12-02 NOTE — ASSESSMENT & PLAN NOTE
Patient's (Body mass index is 40.49 kg/m².) indicates that they are morbidly/severely obese (BMI > 40 or > 35 with obesity - related health condition) with health conditions that include  pre-diabetes elevated LD  . Weight is newly identified. BMI  is above average; BMI management plan is completed. We discussed low calorie, low carb based diet program, portion control, increasing exercise, and an zach-based approach such as [employer] Pal or Lose It.   -- This is an initial visit patient was referred over by her OB/GYN.  --Body composition analysis was reviewed in short and long-term weight loss goals set up based on this information.  --Baritastic food journal set up in office today and calorie and macro nutrient goals set.  Patient advised that her #1 goal is just to start exploring what she is eating throughout the day and journaling it.  Encouraged that were looking to stay at or below calorie and carbohydrates goal.  Looking to stay at or above protein fiber and water goal.  Comfortable bring in journal to next office visit for review.  --Currently patient is not interested in medication.  We did look at medication eligibility sheet and it does appear to have coverage on injectable weight loss medications as well as Qsymia.  We did discuss that the injectable medications could be a good option in the future as she is on the prediabetic range.  That we have lots of patients on no medications that are also very successful.  --Recent labs reviewed of note is mildly elevated LDL, A1c, and liver enzyme.  Additional labs and UDS ordered today and completed in office.  He is currently taking an over-the-counter vitamin D supplementation so we will repeat vitamin D levels.  --Based on binge eating disorder questionnaire, she is not qualified as diagnostic but does have strong binge eating tendencies.  Discussed it is often helpful to talk to behavioral health regarding this.  The future if would like a referral to  behavioral health this is an option.

## 2024-12-03 LAB
25(OH)D3+25(OH)D2 SERPL-MCNC: 36.4 NG/ML (ref 30–100)
ALBUMIN SERPL-MCNC: 4.6 G/DL (ref 3.5–5.2)
ALBUMIN/GLOB SERPL: 1.6 G/DL
ALP SERPL-CCNC: 101 U/L (ref 39–117)
ALT SERPL-CCNC: 65 U/L (ref 1–33)
AMPHETAMINES UR QL SCN: NEGATIVE NG/ML
AST SERPL-CCNC: 41 U/L (ref 1–32)
BARBITURATES UR QL SCN: NEGATIVE NG/ML
BENZODIAZ UR QL SCN: NEGATIVE NG/ML
BILIRUB SERPL-MCNC: 0.3 MG/DL (ref 0–1.2)
BUN SERPL-MCNC: 17 MG/DL (ref 6–20)
BUN/CREAT SERPL: 21 (ref 7–25)
BZE UR QL SCN: NEGATIVE NG/ML
CALCIUM SERPL-MCNC: 10.4 MG/DL (ref 8.6–10.5)
CANNABINOIDS UR QL SCN: NEGATIVE NG/ML
CHLORIDE SERPL-SCNC: 103 MMOL/L (ref 98–107)
CO2 SERPL-SCNC: 27.8 MMOL/L (ref 22–29)
CREAT SERPL-MCNC: 0.81 MG/DL (ref 0.57–1)
CREAT UR-MCNC: 214 MG/DL (ref 20–300)
EGFRCR SERPLBLD CKD-EPI 2021: 88 ML/MIN/1.73
GLOBULIN SER CALC-MCNC: 2.8 GM/DL
GLUCOSE SERPL-MCNC: 124 MG/DL (ref 65–99)
INSULIN SERPL-ACNC: 10.8 UIU/ML (ref 2.6–24.9)
LABORATORY COMMENT REPORT: NORMAL
METHADONE UR QL SCN: NEGATIVE NG/ML
OPIATES UR QL SCN: NEGATIVE NG/ML
OXYCODONE+OXYMORPHONE UR QL SCN: NEGATIVE NG/ML
PCP UR QL: NEGATIVE NG/ML
PH UR: 5.4 [PH] (ref 4.5–8.9)
POTASSIUM SERPL-SCNC: 5.5 MMOL/L (ref 3.5–5.2)
PROPOXYPH UR QL SCN: NEGATIVE NG/ML
PROT SERPL-MCNC: 7.4 G/DL (ref 6–8.5)
SODIUM SERPL-SCNC: 143 MMOL/L (ref 136–145)

## 2024-12-06 ENCOUNTER — LAB (OUTPATIENT)
Dept: LAB | Facility: HOSPITAL | Age: 49
End: 2024-12-06
Payer: COMMERCIAL

## 2024-12-06 ENCOUNTER — OFFICE VISIT (OUTPATIENT)
Dept: INTERNAL MEDICINE | Facility: CLINIC | Age: 49
End: 2024-12-06
Payer: COMMERCIAL

## 2024-12-06 VITALS
BODY MASS INDEX: 41.32 KG/M2 | OXYGEN SATURATION: 98 % | WEIGHT: 242 LBS | HEART RATE: 97 BPM | HEIGHT: 64 IN | DIASTOLIC BLOOD PRESSURE: 84 MMHG | SYSTOLIC BLOOD PRESSURE: 126 MMHG

## 2024-12-06 DIAGNOSIS — Z23 NEED FOR INFLUENZA VACCINATION: ICD-10-CM

## 2024-12-06 DIAGNOSIS — E04.9 ENLARGED THYROID: ICD-10-CM

## 2024-12-06 DIAGNOSIS — E04.9 ENLARGED THYROID: Primary | ICD-10-CM

## 2024-12-06 PROCEDURE — 84443 ASSAY THYROID STIM HORMONE: CPT

## 2024-12-06 PROCEDURE — 36415 COLL VENOUS BLD VENIPUNCTURE: CPT

## 2024-12-06 PROCEDURE — 86376 MICROSOMAL ANTIBODY EACH: CPT

## 2024-12-06 NOTE — PROGRESS NOTES
Office Note     Name: Moira Cardoso    : 1975     MRN: [id number]     Chief Complaint  Results    Subjective     History of Present Illness:  Moira Cardoso is a 49 y.o. female who presents today for follow-up after MRI from neurology.  It did show enlargement of the thyroid gland which could be related to underlying goiter.  Patient denies any sensation in the neck of feeling like her thyroid would be enlarged.  She denies any trouble swallowing or feeling like food is stuck in her throat.  She recently saw [facility] weight management and has started to been track her food to possibly follow a lower inflammatory diet            Procedure Component Value Ref Range Date/Time   MRI Orbit Face Neck With & Without Contrast [[id number]] Collected: 24 1452   Order Status: Completed Updated: 24 1513   Narrative:     MRI ORBIT FACE NECK W WO CONTRAST    Date of Exam: 2024 2:21 PM EST    Indication: Chronic Jaw and Face Pain B/L.     Comparison: None available.    Technique:  Routine multiplanar/multisequence images of the MRI neck were obtained before and after the uneventful intravenous administration of 20 cc Multihance.      Findings:  Best defined on STIR axial image #25 series 6 and postcontrast axial image #24 series 8 there is some signal within the medial pterygoid musculature bilaterally and some enhancement. This could reflect some nonspecific inflammation. The exam was not  carried out with a TMJ protocol. The visualized portions of the TMJs seem intact. There are some nonspecific level 1B and 2A lymph nodes of questionable significance. Largest 2A lymph node on the right measures 1 cm. Largest level 1B lymph node on the  left measures 1.1 cm.    The pharyngeal and laryngeal areas do not appear unusual.    There is heterogeneity of the thyroid that might be reflective of goiter.    Visualized portions of the cervical spine do not appear unusual.   Impression:      Impression:  1.Nonspecific inflammation in the area of the medial pterygoid muscles bilaterally.  2.There are some nonspecific lymph nodes within the neck.  3.Heterogeneity of the thyroid that could relate to underlying goiter.           Past Medical History:   Diagnosis Date    Abnormal Pap smear of cervix     30+ years ago    Class 2 obesity due to excess calories without serious comorbidity with body mass index (BMI) of 38.0 to 38.9 in adult     Endometriosis     diagnosed during c/s    Frozen shoulder     Migraine        Past Surgical History:   Procedure Laterality Date     SECTION      DENTAL PROCEDURE         Social History     Socioeconomic History    Marital status:    Tobacco Use    Smoking status: Never     Passive exposure: Never    Smokeless tobacco: Never   Vaping Use    Vaping status: Never Used   Substance and Sexual Activity    Alcohol use: Yes     Comment: occasionally    Drug use: No    Sexual activity: Yes     Partners: Male     Birth control/protection: Post-menopausal         Current Outpatient Medications:     Cholecalciferol 25 MCG (1000 UT) tablet, Take 1 tablet by mouth Daily. 2000 iu daily, Disp: , Rfl:     esomeprazole (nexIUM) 20 MG capsule, Take 1 capsule by mouth Every Morning Before Breakfast., Disp: , Rfl:     Fremanezumab-vfrm (Ajovy) 225 MG/1.5ML solution auto-injector, Inject 225 mg under the skin into the appropriate area as directed Every 30 (Thirty) Days., Disp: 1.5 mL, Rfl: 11    indomethacin (INDOCIN) 25 MG capsule, Take 1 capsule by mouth 2 (Two) Times a Day With Meals for 30 days., Disp: 60 capsule, Rfl: 3    Omega-3 Fatty Acids (fish oil) 1000 MG capsule capsule, Take  by mouth Daily With Breakfast., Disp: , Rfl:     rizatriptan (MAXALT) 10 MG tablet, Take 1 tablet by mouth 1 (One) Time As Needed for Migraine for up to 1 dose. May repeat in 2 hours if needed, Disp: 12 tablet, Rfl: 6    Objective     Vital Signs  /84   Pulse 97   Ht 163.8 cm  "(64.49\")   Wt 110 kg (242 lb)   SpO2 98%   BMI 40.91 kg/m²   Estimated body mass index is 40.91 kg/m² as calculated from the following:    Height as of this encounter: 163.8 cm (64.49\").    Weight as of this encounter: 110 kg (242 lb).           Physical Exam  Vitals and nursing note reviewed.   Constitutional:       General: She is awake.      Appearance: Normal appearance. She is well-groomed. She is obese.   HENT:      Head: Normocephalic and atraumatic.   Eyes:      Extraocular Movements: Extraocular movements intact.      Pupils: Pupils are equal, round, and reactive to light.      Comments: Glasses in place   Neck:      Thyroid: No thyroid mass, thyromegaly or thyroid tenderness.   Cardiovascular:      Rate and Rhythm: Normal rate and regular rhythm.   Pulmonary:      Effort: Pulmonary effort is normal.   Musculoskeletal:         General: Normal range of motion.   Skin:     General: Skin is warm and dry.   Neurological:      Mental Status: She is alert and oriented to person, place, and time.      Comments: Mental status fully intact as patient was able to provide a detailed description of the events   Psychiatric:         Mood and Affect: Mood normal.         Behavior: Behavior normal. Behavior is cooperative.                   Assessment and Plan     Diagnoses and all orders for this visit:    1. Enlarged thyroid (Primary)  -     US Thyroid  -     TSH Rfx On Abnormal To Free T4; Future  -     Thyroid Peroxidase Antibody; Future    2. Need for influenza vaccination  -     Fluzone >6mos    Plan  During physical assessment today, I did not note any significant enlargement to the thyroid.  Will order a thyroid level and thyroid level antibodies.  Ultrasound of thyroid will also be ordered.  We will hold on referral to endocrinology at this time.  Continue with current plan of care.  Flu shot provided in office today  Go to ER if any condition worsens or severe  Plan to follow-up as scheduled    Follow " Up  Return for Next scheduled follow up.    PANCHO Mack    Part of this note may be an electronic transcription/translation of spoken language to printed text using the Dragon Dictation System.

## 2024-12-06 NOTE — PROGRESS NOTES
Immunization  Immunization performed in left deltoid by Kyra Mendenhall MA. Patient tolerated the procedure well without complications.  12/06/24   Kyra Mendenhall MA

## 2024-12-07 LAB — TSH SERPL DL<=0.05 MIU/L-ACNC: 1.48 UIU/ML (ref 0.27–4.2)

## 2024-12-08 LAB — THYROPEROXIDASE AB SERPL-ACNC: 229 IU/ML (ref 0–34)

## 2024-12-09 ENCOUNTER — TELEPHONE (OUTPATIENT)
Dept: INTERNAL MEDICINE | Facility: CLINIC | Age: 49
End: 2024-12-09
Payer: COMMERCIAL

## 2024-12-09 DIAGNOSIS — R76.8 THYROID ANTIBODY POSITIVE: Primary | ICD-10-CM

## 2024-12-09 NOTE — TELEPHONE ENCOUNTER
----- Message from Pretty Willams sent at 12/9/2024  8:26 AM EST -----  Please let patient know her labs resulted.  Thyroid level was normal but your antibodies were positive.  I did go ahead and place a referral to endocrinology so that we can get started on this to get you scheduled.

## 2024-12-11 ENCOUNTER — SPECIALTY PHARMACY (OUTPATIENT)
Dept: NEUROLOGY | Facility: CLINIC | Age: 49
End: 2024-12-11
Payer: COMMERCIAL

## 2024-12-11 NOTE — PROGRESS NOTES
Specialty Pharmacy Refill Coordination Note     Moira is a 49 y.o. female contacted today regarding refills of Ajovy specialty medication(s).    Reviewed and verified with patient:       Specialty medication(s) and dose(s) confirmed: yes    Refill Questions      Flowsheet Row Most Recent Value   Changes to allergies? No   Changes to medications? No   New conditions or infections since last clinic visit No   Unplanned office visit, urgent care, ED, or hospital admission in the last 4 weeks  No   How does patient/caregiver feel medication is working? Very good   Financial problems or insurance changes  No   Since the previous refill, were any specialty medication doses or scheduled injections missed or delayed?  No   Does this patient require a clinical escalation to a pharmacist? No            Delivery Questions      Flowsheet Row Most Recent Value   Delivery method [employer]   Delivery address verified with patient/caregiver? Yes   Delivery address Home   Number of medications in delivery 1   Medication(s) being filled and delivered Fremanezumab-vfrm (Ajovy)   Doses left of specialty medications 0   Copay verified? Yes   Copay amount 0.00   Copay form of payment No copayment ($0)   Ship Date 12/11   Delivery Date 12/12   Signature Required No                   Follow-up: 28 day(s)     Amirah Hamilton, Pharmacy Technician  Specialty Pharmacy Technician

## 2024-12-12 NOTE — PROGRESS NOTES
[facility]  [address]  [place] [address]     Office Note      Date: 2024  Patient Name: Moira aCrdoso  MRN: [id number]  : 1975    Subjective     Chief Complaint  Obesity Management follow-up    Moira Cardoso presents to [facility] for obesity management.     Patient is unsure with weight loss progress. Appetite is moderately controlled.  Currently working on lifestyle changes only; no medications prescribed from this office.  The patient is not taking multivitamin and is taking fish oil.  The patient is using a food journal.  The patient rates current efforts as 8 out of 10.    The patient is exercising with a FITT score of:    Frequency Intensity Time Strength Training   [x]   0, none [x]   0 [x]   0 [x]   0   []   1 (1-2x/week) []   1 (light) []   1 (<10 min) []   1 (1x/week)   []   2 (3-5x/week) []   2 (moderate) []   2 (10-20 min) []   2 (2x/week)   []   3 (daily) []   3 (moderately hard)  []   4 (very hard) []   3 (20-30 min)  []   4 (>30 min) []   3 (3-4x/week)     Review of Systems   Constitutional:  Negative for appetite change and fatigue.   Eyes:  Negative for visual disturbance.   Cardiovascular:  Negative for chest pain and palpitations.   Gastrointestinal:  Negative for constipation and indigestion.   Neurological:  Negative for light-headedness.   All other systems reviewed and are negative.    Objective   Start weight: 239.6 pounds.    Total Loss lb/%Loss of beginning body weight (BBW): -4lb/-1.7%  Change in weight since last visit: -4    Recent Weight History:   Wt Readings from Last 6 Encounters:   24 107 kg (235 lb 9.6 oz)   24 110 kg (242 lb)   24 109 kg (239 lb 9.6 oz)   24 111 kg (244 lb)   10/24/24 111 kg (244 lb 6.4 oz)   24 110 kg (242 lb 12.8 oz)     Body mass index is 39.82 kg/m².   Body composition analysis completed and showed:   Body Fat %:  "48.6    Measurements (in inches)  Waist Circumference: 46    Vital Signs:   /78 (BP Location: Left arm, Patient Position: Sitting)   Pulse 89   Ht 163.8 cm (64.5\")   Wt 107 kg (235 lb 9.6 oz)   BMI 39.82 kg/m²       Physical Exam  Vitals reviewed.   Constitutional:       Appearance: She is well-developed.      Comments: overweight   Pulmonary:      Effort: Pulmonary effort is normal.   Neurological:      Mental Status: She is alert.   Psychiatric:         Attention and Perception: Attention normal.         Mood and Affect: Mood normal.         Speech: Speech normal.         Behavior: Behavior normal.        Result Review :     Common labs          9/11/2024    08:38 12/2/2024    11:06   Common Labs   Glucose 112  124    BUN 15  17    Creatinine 0.76  0.81    Sodium 142  143    Potassium 4.4  5.5    Chloride 106  103    Calcium 9.7  10.4    Total Protein  7.4    Albumin 4.2  4.6    Total Bilirubin 0.3  0.3    Alkaline Phosphatase 82  101    AST (SGOT) 24  41    ALT (SGPT) 39  65    WBC 6.02     Hemoglobin 13.4     Hematocrit 39.6     Platelets 274     Total Cholesterol 177     Triglycerides 82     HDL Cholesterol 56     LDL Cholesterol  106     Hemoglobin A1C 5.90                Assessment / Plan        Diagnoses and all orders for this visit:    1. Obesity, Class III, BMI 40-49.9 (morbid obesity) (Primary)  Assessment & Plan:  Patient's (Body mass index is 39.82 kg/m².) indicates that they are obese (BMI >30) with health conditions that include  pre-diabets  . Weight is improving with treatment. BMI  is above average; BMI management plan is completed. We discussed low calorie, low carb based diet program, portion control, increasing exercise, and an zach-based approach such as HipLogiq Pal or Lose It.   --This is a follow up visit and she is down around 4lbs  --She has decreased dt sodas and cookie snacks.   --She is journaling daily; keep up great effort  --Brain storming ways to increase protein, fiber " and water      We discussed the risks, benefits, and limitations of treatments. Continue medications and OTC supplements as discussed. Patient verbalizes understanding of and agreement with management plan.     Follow Up   Return in about 4 weeks (around 1/13/2025).  Patient was given instructions and counseling regarding her condition or for health maintenance advice. Please see specific information pulled into the AVS if appropriate.     I spent 30 minutes on this date of service. This time includes time spent by me in the following activities:preparing for the visit, counseling and educating the patient/family/caregiver, ordering medications, tests, or procedures and documenting information in the medical record.    Anneliese Barajas, APRN  12/16/2024

## 2024-12-16 ENCOUNTER — OFFICE VISIT (OUTPATIENT)
Dept: BARIATRICS/WEIGHT MGMT | Facility: CLINIC | Age: 49
End: 2024-12-16
Payer: COMMERCIAL

## 2024-12-16 VITALS
HEART RATE: 89 BPM | DIASTOLIC BLOOD PRESSURE: 78 MMHG | BODY MASS INDEX: 39.25 KG/M2 | WEIGHT: 235.6 LBS | HEIGHT: 65 IN | SYSTOLIC BLOOD PRESSURE: 122 MMHG

## 2024-12-16 DIAGNOSIS — E66.01 OBESITY, CLASS III, BMI 40-49.9 (MORBID OBESITY): Primary | ICD-10-CM

## 2024-12-16 RX ORDER — INDOMETHACIN 25 MG/1
25 CAPSULE ORAL 2 TIMES DAILY WITH MEALS
COMMUNITY
Start: 2024-12-08 | End: 2024-12-18 | Stop reason: SDUPTHER

## 2024-12-16 NOTE — ASSESSMENT & PLAN NOTE
Patient's (Body mass index is 39.82 kg/m².) indicates that they are obese (BMI >30) with health conditions that include  pre-diabets  . Weight is improving with treatment. BMI  is above average; BMI management plan is completed. We discussed low calorie, low carb based diet program, portion control, increasing exercise, and an zach-based approach such as [employer] Pal or Lose It.   --This is a follow up visit and she is down around 4lbs  --She has decreased dt sodas and cookie snacks.   --She is journaling daily; keep up great effort  --Brain storming ways to increase protein, fiber and water

## 2024-12-18 ENCOUNTER — OFFICE VISIT (OUTPATIENT)
Dept: NEUROLOGY | Facility: CLINIC | Age: 49
End: 2024-12-18
Payer: COMMERCIAL

## 2024-12-18 ENCOUNTER — PATIENT ROUNDING (BHMG ONLY) (OUTPATIENT)
Dept: BARIATRICS/WEIGHT MGMT | Facility: CLINIC | Age: 49
End: 2024-12-18
Payer: COMMERCIAL

## 2024-12-18 VITALS — HEART RATE: 83 BPM | OXYGEN SATURATION: 100 % | SYSTOLIC BLOOD PRESSURE: 124 MMHG | DIASTOLIC BLOOD PRESSURE: 84 MMHG

## 2024-12-18 DIAGNOSIS — G43.019 INTRACTABLE MIGRAINE WITHOUT AURA AND WITHOUT STATUS MIGRAINOSUS: Primary | ICD-10-CM

## 2024-12-18 DIAGNOSIS — R68.84 JAW PAIN: ICD-10-CM

## 2024-12-18 PROCEDURE — 99214 OFFICE O/P EST MOD 30 MIN: CPT | Performed by: PSYCHIATRY & NEUROLOGY

## 2024-12-18 RX ORDER — INDOMETHACIN 50 MG/1
50 CAPSULE ORAL 2 TIMES DAILY WITH MEALS
Qty: 90 CAPSULE | Refills: 0 | Status: SHIPPED | OUTPATIENT
Start: 2024-12-18 | End: 2025-02-01

## 2024-12-18 NOTE — PROGRESS NOTES
A [employer] message has been sent to the patient for PATIENT ROUNDING for [facility].

## 2024-12-18 NOTE — PROGRESS NOTES
Subjective:    CC: Moira Cardoso is in clinic today for follow up for history of migraines and bilateral jaw pain.    HPI:  HPI:  9/19/2019: She is in clinic for regular follow-up.  Since her last visit, she has been taking Ajovy every month.  She is now done about 5 Ajovy injections.  She reports that the effect with Ajovy is as not as good as what it was with Aimovig.  With Aimovig, she had complete resolution of headaches but with Ajovy, she is getting 3-4 breakthrough headaches in a month.  Still better than more than 15 headaches that she was experiencing prior to starting CGRP blockers.  She takes rizatriptan or ibuprofen as needed as an abortive therapy and it works really well.  She denies any side effects with Ajovy use.  She used Aimovig for 6 months prior to Ajovy and then insurance denied it.    9/9/2020: She is in clinic for regular follow-up.  Since her last visit 1 year ago, she reports that in the last 3 to 4 months, migraine intensity and frequency is slightly worse.  She is now experiencing 5-6 breakthrough migraines increase from 2-3 breakthrough migraines.  She is on Ajovy monthly injections and has done it for 1 year now.  Prior to Ajovy, she was on Aimovig and it had helped her significantly in controlling migraines but unfortunately insurance denied it.  She currently takes Maxalt as needed as an abortive treatment and does report side effects with Maxalt use.    1/13/2021: She is in clinic for regular follow-up.  Since her last visit in September, insurance did not approve Emgality.  She did try Ajovy prior to trying Emgality for about 6 months but it did not really help.  She had an excellent response with Aimovig monthly injection.  In the past, she has tried and failed Topamax and propranolol.    11/9/2021: She is in clinic for regular follow-up.  Since her last visit in January 2021, Aimovig is now approved by insurance and she has been taking it every month.  She reports that she has  had excellent response with Aimovig and has had almost complete resolution of migraines.  Whenever she gets breakthrough migraine, she will use rizatriptan at the onset and it does work as an abortive treatment.    6/20/2022: She is in clinic for regular follow-up.  Since her last visit in November 2021, she reports that while he was reevaluated excellent control.  She was on Aimovig which was switched to Ajovy due to insurance.  Initially with Ajovy, migraines were little worse but with continued use of Ajovy, they have become better.  She reports that she has been migraine free for last several months and did not have to take Maxalt.  She is tolerating Ajovy well without any side effects as well.    3/20/2023: She is in clinic for regular follow-up.  Since her last visit in June 2022, she has been doing Ajovy once a month injection with excellent response.  She reports that she has had only few migraines since her last visit.  She uses Maxalt with naproxen and this combination works really well as an abortive treatment.  She is now postmenopausal and is wondering if stopping Ajovy would be a good idea and see if menopause will reduce intensity and frequency of migraines naturally.  In addition to this, she reports that she continues to have this bilateral jaw area tenderness and mild intensity pain throughout the day.  She wakes up with it and goes to bed with it.  On the last visit, she had complained of bilateral ear pain for which I had to send her to ENT and detailed ENT evaluation did not reveal any abnormalities but now this pain is shifted to jaw area.    10/20/2023: She is in clinic for regular follow-up.  Since her last visit in March 2023, due to insurance change, Ajovy had to be switched to Aimovig and then insurance denied Aimovig so she was started on Nurtec every other day about 6 to 8 weeks ago.  With Nurtec, she has not had good response.  She is reporting 8 migraine days in a month.  Maxalt is  working well as an abortive treatment.  We will try muscle relaxer for bilateral jaw pain but it made her really sleepy.  She reports that it is worse first thing in the morning and then she does experiences throughout the day as well.  She takes ibuprofen as needed which helps but then pain returns once the effect of ibuprofen wears off.    4/13/2024: She is in clinic for regular follow-up.  Since her last visit in October 2023, insurance did approve Ajovy so she has been on Ajovy for last 6 months.  She has had an excellent response and reports that she did not have to take any Maxalt in last 6 months.  However the most important concern is bilateral temple area pain, ache bilateral jaw pain and sometimes burning sensation involving the jaw and neck area bilaterally in front.  She has seen ENT and her dentist who felt that this was not related to TMJ and may be coming from GERD.  She takes Tylenol when it becomes really bad and it does help temporarily and then the symptoms returned.  She has taken ibuprofen as well.    11/8/2024: She is in clinic for regular follow-up.  Since her last visit 7 months ago, she reports that her migraines remain under excellent control with use of Ajovy once a month injection.  She reports 1-2 breakthrough migraines however she continues to have daily bilateral jaw and facial pain.  She has also recently seen [facility].  They recommended MRI brain for further evaluation.  No definitive diagnosis was made.  She reports that when she takes ibuprofen it helps temporarily and then the symptoms return.    12/18/2024: He is in clinic for regular follow-up.  Since her last visit in November 2024, she did complete MRI of orbit, face.  I reviewed the results personally.  It showed mild nonspecific inflammation of the medial pterygoid muscles bilaterally.  There was also evidence of nonspecific lymph nodes within the neck and then in heterogeneity of thyroid gland.  She has been  taking indomethacin 25 mg twice daily with some improvement in jaw pain but the effect of indomethacin last only temporarily.  She also has seen her primary care physician and thyroid peroxidase antibody was done for further evaluation it has been positive.  Thyroid function is normal though.  She is scheduled to see endocrinology now.  Migraine frequency remains stable with use of Ajovy once a month injection and she takes Maxalt as needed as an abortive treatment.      The following portions of the patient's history were reviewed and updated as of 2024: allergies, social history, and problem list.       Current Outpatient Medications:     indomethacin (INDOCIN) 50 MG capsule, Take 1 capsule by mouth 2 (Two) Times a Day With Meals for 45 days., Disp: 90 capsule, Rfl: 0    Cholecalciferol (Vitamin D3) 50 MCG (2000 UT) tablet, Take 1 tablet by mouth Daily. 2000 iu daily, Disp: , Rfl:     esomeprazole (nexIUM) 20 MG capsule, Take 1 capsule by mouth Every Morning Before Breakfast., Disp: , Rfl:     Fremanezumab-vfrm (Ajovy) 225 MG/1.5ML solution auto-injector, Inject 225 mg under the skin into the appropriate area as directed Every 30 (Thirty) Days., Disp: 1.5 mL, Rfl: 11    Omega-3 Fatty Acids (fish oil) 1000 MG capsule capsule, Take  by mouth Daily With Breakfast., Disp: , Rfl:     rizatriptan (MAXALT) 10 MG tablet, Take 1 tablet by mouth 1 (One) Time As Needed for Migraine for up to 1 dose. May repeat in 2 hours if needed, Disp: 12 tablet, Rfl: 6   Past Medical History:   Diagnosis Date    Abnormal Pap smear of cervix     30+ years ago    Class 2 obesity due to excess calories without serious comorbidity with body mass index (BMI) of 38.0 to 38.9 in adult     Endometriosis     diagnosed during c/s    Frozen shoulder     Headache, tension-type     Migraine       Past Surgical History:   Procedure Laterality Date     SECTION      DENTAL PROCEDURE        Family History   Problem Relation Age of Onset     Depression Mother     COPD Mother     Mental illness Mother         Depression    Transient ischemic attack Mother     Hypertension Mother     Sleep apnea Mother     Kidney failure Father         amyloidosis- dialysis    Pancreatic cancer Paternal Aunt     Arthritis Maternal Grandmother     Alzheimer's disease Maternal Grandmother     Osteoporosis Maternal Grandmother     Parkinsonism Paternal Grandfather     Breast cancer Neg Hx     Ovarian cancer Neg Hx     Uterine cancer Neg Hx     Colon cancer Neg Hx         Review of Systems  Objective:    /84   Pulse 83   LMP 01/06/2021   SpO2 100%     Neurology Exam:  General apperance: NAD.     Mental status: Alert, awake and oriented to time place and person.    Language and Speech: No aphasia or dysarthria.    CN II to XII: Intact.    Opthalmoscopic Exam: No papilledema.    Motor:  Right UE muscle strength 5/5. Normal tone.     Left UE muscle strength 5/5. Normal tone.      Right LE muscle strength 5/5. Normal tone.     Left LE muscle strength 5/5. Normal tone.      Sensory: Normal light touch, vibration and pinprick sensation bilaterally.    DTRs: 2+ bilaterally.    Babinski: Negative bilaterally.    Co-ordination: Normal finger-to-nose, heel to shin B/L.    Rhomberg: Negative.    Gait: Normal.    Cardiovascular: Regular rate and rhythm without murmur, gallop or rub.    Assessment and Plan:  1. Intractable migraine without aura and without status migrainosus  2. Jaw pain  MRI orbit, face showed nonspecific mild inflammation of pterygoid muscles bilaterally.  There is also evidence of heterogeneity of thyroid gland and nonspecific lymph nodes.  She underwent thyroid peroxidase antibody which is strongly positive but her thyroid function is normal.  She is scheduled to see endocrinology.  With indomethacin 25 mg twice daily, she has had mild improvement.  I will be increasing dose to 50 mg twice daily for next 4 to 6 weeks and then plan is to stop.  With Christos  migraines are under good control.  Maxalt is working well as an abortive treatment.  The results are back in clinic in 3 to 4 months for follow-up.       I spent 30 minutes in patient care: Reviewing records prior to the visit, entering orders and documentation and spent more than torres 50% of this time face-to-face in management, instructions and education regarding above mentioned diagnosis and also on counseling and discussing about taking medication regularly, possible side effects with medication use, importance of good sleep hygiene, good hydration and regular exercise.    Return in about 4 months (around 4/18/2025).       Note to patient: The 21st Century Cures Act makes medical notes like these available to patients in the interest of transparency. However, be advised this is a medical document. It is intended as peer to peer communication. It is written in medical language and may contain abbreviations or verbiage that are unfamiliar. It may appear blunt or direct. Medical documents are intended to carry relevant information, facts as evident, and the clinical opinion of the physician.

## 2024-12-20 ENCOUNTER — OFFICE VISIT (OUTPATIENT)
Age: 49
End: 2024-12-20
Payer: COMMERCIAL

## 2024-12-20 VITALS
SYSTOLIC BLOOD PRESSURE: 122 MMHG | HEART RATE: 96 BPM | WEIGHT: 239.2 LBS | BODY MASS INDEX: 39.85 KG/M2 | HEIGHT: 65 IN | DIASTOLIC BLOOD PRESSURE: 84 MMHG | OXYGEN SATURATION: 96 %

## 2024-12-20 DIAGNOSIS — R76.8 THYROID ANTIBODY POSITIVE: Primary | ICD-10-CM

## 2024-12-20 NOTE — ASSESSMENT & PLAN NOTE
Explained to patient that her body is seeing her thyroid as foreign and producing antibodies which are slowly causing damage to her thyroid gland  Eventually, patient may need thyroid supplementation  Explained to patient there is no treatment for thyroid antibodies and they will always be positive  There is no need to continue to test for thyroid antibodies, but she should have TSH yearly to screen for hypothyroidism  Patient advised she could cancel her ultrasound of the neck, as she does not have thyromegaly or palpable nodules on exam today

## 2024-12-20 NOTE — PROGRESS NOTES
"     Office Note      Date: 2024  Patient Name: Moira Cardoso  MRN: [id number]  : 1975    Chief Complaint   Patient presents with    Thyroid antibody positive       History of Present Illness:   Moira Cardoso is a 49 y.o. female who presents for Thyroid antibody positive  . Symptoms are: ear, neck and jaw pain. Symptoms began in: over a year ago. They are increasing. Lab work showed normal thyroid function.  She also had an mri of head and neck that demonstrated some heterogeneity of the thyroid. No  trouble swallowing or choking. No pain or tenderness at thyroid region. Has thyroid ultrasound scheduled for next week, but is unsure if this is needed or not. Is going through menopause currently.    Subjective      Patient was born where: [place].  Facial radiation exposure: No.  High iodine intake: No  Family hx of thyroid disease: No.    Review of Systems:   Review of Systems   Constitutional:  Positive for fatigue.   HENT:  Positive for ear pain. Negative for trouble swallowing.    Respiratory:  Negative for choking.    Gastrointestinal:  Positive for constipation.   Endocrine: Positive for cold intolerance.   Musculoskeletal:  Positive for neck pain.       The following portions of the patient's history were reviewed and updated as appropriate: allergies, current medications, past family history, past medical history, past social history, past surgical history, and problem list.    Objective     Visit Vitals  /84 (BP Location: Left arm, Patient Position: Sitting)   Pulse 96   Ht 163.8 cm (64.5\")   Wt 109 kg (239 lb 3.2 oz)   LMP 2021   SpO2 96%   BMI 40.42 kg/m²       Physical Exam:  Physical Exam  Vitals reviewed.   Constitutional:       General: She is not in acute distress.     Appearance: Normal appearance. She is obese.   HENT:      Head: Normocephalic and atraumatic.      Nose: Nose normal.   Eyes:      Conjunctiva/sclera: Conjunctivae normal.   Neck:      Thyroid: No " thyroid mass, thyromegaly or thyroid tenderness.   Cardiovascular:      Rate and Rhythm: Normal rate and regular rhythm.   Pulmonary:      Effort: Pulmonary effort is normal.   Musculoskeletal:      Cervical back: Neck supple. No tenderness.   Lymphadenopathy:      Cervical: No cervical adenopathy.   Skin:     General: Skin is warm and dry.   Neurological:      General: No focal deficit present.      Mental Status: She is alert and oriented to person, place, and time. Mental status is at baseline.   Psychiatric:         Mood and Affect: Mood normal.         Behavior: Behavior normal.         Thought Content: Thought content normal.         Judgment: Judgment normal.     Labs:    TSH  TSH          12/6/2024    15:41   TSH   TSH 1.480      Free T4  Free T4   Date Value Ref Range Status   08/10/2023 1.16 0.93 - 1.70 ng/dL Final     Comment:     Results may be falsely increased if patient taking Biotin.      TPO  Thyroid Peroxidase Antibody   Date Value Ref Range Status   12/06/2024 229 (H) 0 - 34 IU/mL Final     CBC w/DIFF  Lab Results   Component Value Date    WBC 6.02 09/11/2024    RBC 4.37 09/11/2024    HGB 13.4 09/11/2024    HCT 39.6 09/11/2024    MCV 90.6 09/11/2024    MCH 30.7 09/11/2024    MCHC 33.8 09/11/2024    RDW 11.6 (L) 09/11/2024    RDWSD 38.0 09/11/2024    MPV 10.1 09/11/2024     09/11/2024     MRI ORBIT FACE NECK W WO CONTRAST     Date of Exam: 11/16/2024 2:21 PM EST     Indication: Chronic Jaw and Face Pain B/L.     Comparison: None available.     Technique:  Routine multiplanar/multisequence images of the MRI neck were obtained before and after the uneventful intravenous administration of 20 cc Multihance.       Findings:  Best defined on STIR axial image #25 series 6 and postcontrast axial image #24 series 8 there is some signal within the medial pterygoid musculature bilaterally and some enhancement. This could reflect some nonspecific inflammation. The exam was not   carried out with a TMJ  protocol. The visualized portions of the TMJs seem intact. There are some nonspecific level 1B and 2A lymph nodes of questionable significance. Largest 2A lymph node on the right measures 1 cm. Largest level 1B lymph node on the   left measures 1.1 cm.     The pharyngeal and laryngeal areas do not appear unusual.     There is heterogeneity of the thyroid that might be reflective of goiter.     Visualized portions of the cervical spine do not appear unusual.     IMPRESSION:  Impression:  1.Nonspecific inflammation in the area of the medial pterygoid muscles bilaterally.  2.There are some nonspecific lymph nodes within the neck.  3.Heterogeneity of the thyroid that could relate to underlying goiter.     Assessment / Plan      Assessment & Plan:  Diagnoses and all orders for this visit:    1. Thyroid antibody positive (Primary)  Assessment & Plan:  Explained to patient that her body is seeing her thyroid as foreign and producing antibodies which are slowly causing damage to her thyroid gland  Eventually, patient may need thyroid supplementation  Explained to patient there is no treatment for thyroid antibodies and they will always be positive  There is no need to continue to test for thyroid antibodies, but she should have TSH yearly to screen for hypothyroidism  Patient advised she could cancel her ultrasound of the neck, as she does not have thyromegaly or palpable nodules on exam today           Current Outpatient Medications   Medication Instructions    Ajovy 225 mg, Subcutaneous, Every 30 Days    esomeprazole (NEXIUM) 20 mg, Every Morning Before Breakfast    indomethacin (INDOCIN) 50 mg, Oral, 2 Times Daily With Meals    Omega-3 Fatty Acids (fish oil) 1000 MG capsule capsule Daily With Breakfast    rizatriptan (MAXALT) 10 mg, Oral, Once As Needed, May repeat in 2 hours if needed    Vitamin D3 2,000 Units, Daily      No follow-ups on file.    Electronically signed by: Kayden Beckham PA-C  12/20/2024

## 2024-12-30 PROBLEM — E66.9 OBESITY (BMI 30-39.9): Status: ACTIVE | Noted: 2024-12-02

## 2025-02-03 RX ORDER — INDOMETHACIN 50 MG/1
50 CAPSULE ORAL 2 TIMES DAILY
Qty: 60 CAPSULE | Refills: 0 | Status: SHIPPED | OUTPATIENT
Start: 2025-02-03

## 2025-02-03 NOTE — TELEPHONE ENCOUNTER
Rx Refill Note  Requested Prescriptions     Pending Prescriptions Disp Refills    indomethacin (INDOCIN) 50 MG capsule [Pharmacy Med Name: INDOMETHACIN 50 MG CAPSULE] 60 capsule      Sig: TAKE 1 CAPSULE BY MOUTH 2 TIMES A DAY WITH MEALS FOR 45 DAYS      Last filled: 12/18/24 #90 with 0 refills  Last office visit with prescribing clinician: 12/18/2024      Next office visit with prescribing clinician: 5/23/2025     Jose Penny MA  02/03/25, 12:00 EST

## 2025-02-11 ENCOUNTER — SPECIALTY PHARMACY (OUTPATIENT)
Dept: GENERAL RADIOLOGY | Facility: HOSPITAL | Age: 50
End: 2025-02-11
Payer: COMMERCIAL

## 2025-02-11 NOTE — PROGRESS NOTES
Specialty Pharmacy Patient Management Program  Neurology Reassessment     Moira Cardoso is a 49 y.o. female with migraines seen by a Neurology provider and enrolled in the Neurology Patient Management program offered by [facility].  A follow-up outreach was conducted, including assessment of continued therapy appropriateness, medication adherence, and side effect incidence and management for Ajovy.     Changes to Insurance Coverage or Financial Support  Rx [employer]   Ajovy Copay Card    Relevant Past Medical History and Comorbidities  Relevant medical history and concomitant health conditions were discussed with the patient. The patient's chart has been reviewed for relevant past medical history and comorbid health conditions and updated as necessary.   Past Medical History:   Diagnosis Date    Abnormal Pap smear of cervix     30+ years ago    Class 2 obesity due to excess calories without serious comorbidity with body mass index (BMI) of 38.0 to 38.9 in adult     Endometriosis     diagnosed during c/s    Frozen shoulder     Headache, tension-type     Migraine      Social History     Socioeconomic History    Marital status:    Tobacco Use    Smoking status: Never     Passive exposure: Never    Smokeless tobacco: Never   Vaping Use    Vaping status: Never Used   Substance and Sexual Activity    Alcohol use: Yes     Alcohol/week: 1.0 standard drink of alcohol     Types: 1 Glasses of wine per week     Comment: occasionally    Drug use: No    Sexual activity: Yes     Partners: Male     Birth control/protection: Post-menopausal     Problem list reviewed by Carrie Mariee [facility] on 2/11/2025 at  9:10 AM    Hospitalizations and Urgent Care Since Last Assessment  ED Visits, Admissions, or Hospitalizations: None   Urgent Office Visits: None     Allergies  Known allergies and reactions were discussed with the patient. The patient's chart has been reviewed for allergy information and  updated as necessary.   Allergies   Allergen Reactions    Penicillins Hives     Allergies reviewed by Carrie Mariee [facility] on 2/11/2025 at  9:10 AM    Relevant Laboratory Values  Common labs          9/11/2024    08:38 12/2/2024    11:06   Common Labs   Glucose 112  124    BUN 15  17    Creatinine 0.76  0.81    Sodium 142  143    Potassium 4.4  5.5    Chloride 106  103    Calcium 9.7  10.4    Total Protein  7.4    Albumin 4.2  4.6    Total Bilirubin 0.3  0.3    Alkaline Phosphatase 82  101    AST (SGOT) 24  41    ALT (SGPT) 39  65    WBC 6.02     Hemoglobin 13.4     Hematocrit 39.6     Platelets 274     Total Cholesterol 177     Triglycerides 82     HDL Cholesterol 56     LDL Cholesterol  106     Hemoglobin A1C 5.90         Current Medication List  This medication list has been reviewed with the patient and evaluated for any interactions or necessary modifications/recommendations, and updated to include all prescription medications, OTC medications, and supplements the patient is currently taking.  This list reflects what is contained in the patient's profile, which has also been marked as reviewed to communicate to other providers it is the most up to date version of the patient's current medication therapy.     Current Outpatient Medications:     Cholecalciferol (Vitamin D3) 50 MCG (2000 UT) tablet, Take 1 tablet by mouth Daily. 2000 iu daily, Disp: , Rfl:     esomeprazole (nexIUM) 20 MG capsule, Take 1 capsule by mouth Every Morning Before Breakfast., Disp: , Rfl:     Fremanezumab-vfrm (Ajovy) 225 MG/1.5ML solution auto-injector, Inject 225 mg under the skin into the appropriate area as directed Every 30 (Thirty) Days., Disp: 1.5 mL, Rfl: 11    indomethacin (INDOCIN) 50 MG capsule, Take 1 capsule by mouth 2 (Two) Times a Day., Disp: 60 capsule, Rfl: 0    Omega-3 Fatty Acids (fish oil) 1000 MG capsule capsule, Take  by mouth Daily With Breakfast., Disp: , Rfl:     rizatriptan (MAXALT) 10 MG tablet, Take 1  tablet by mouth 1 (One) Time As Needed for Migraine for up to 1 dose. May repeat in 2 hours if needed, Disp: 12 tablet, Rfl: 6    Medicines reviewed by Carrie Mariee [facility] on 2/11/2025 at  9:10 AM    Drug Interactions  None     Adverse Drug Reactions  Medication tolerability: Tolerating with no to minimal ADRs          Medication plan: Continue therapy with normal follow-up  Plan for ADR Management: Not required      Adherence, Self-Administration, and Current Therapy Problems  Adherence related patient's specialty therapy was discussed with the patient. The Adherence segment of this outreach has been reviewed and updated.   Adherence Questions  Linked Medication(s) Assessed: Fremanezumab-vfrm (Ajovy)  On average, how many doses/injections does the patient miss per month?: 0  What are the identified reasons for non-adherence or missed doses? : no problems identified  What is the estimated medication adherence level?: %  Based on the patient/caregiver response and refill history, does this patient require an MTP to track adherence improvements?: no    Additional Barriers to Patient Self-Administration: None  Methods for Supporting Patient Self-Administration: Not required    Recently Close Medication Therapy Problems  No medication therapy recommendations to display  Open Medication Therapy Problems  No medication therapy recommendations to display     Goals of Therapy  Goals related to the patient's specialty therapy was discussed with the patient. The Patient Goals segment of this outreach has been reviewed and updated.    Goals Addressed Today        Specialty Pharmacy General Goal      Decrease frequency, severity and duration of migraine attacks from baseline. (~8 migraine days per month)                   Quality of Life Assessment   Quality of Life related to the patient's enrollment in the patient management program and services provided was discussed with the patient. The QOL segment of this  outreach has been reviewed and updated.   Quality of Life Improvement Scale: 9-A good deal better    Reassessment Plan & Follow-Up  Medication Therapy Changes: Continue Ajovy 225 mg subcutaneously monthly  Related Plans, Therapy Recommendations, or Issues to Be Addressed: None  Pharmacist to perform regular reassessments no more than (6) months from the previous assessment.  Care Coordinator to set up future refill outreaches, coordinate prescription delivery, and escalate clinical questions to pharmacist.     Attestation  Therapeutic appropriateness: Appropriate  I attest the patient was actively involved in and has agreed to the above plan of care. If the prescribed therapy is at any point deemed not appropriate based on the current or future assessments, a consultation will be initiated with the patient's specialty care provider to determine the best course of action. The revised plan of therapy will be documented along with any additional patient education provided. Discussed aforementioned material with patient by phone.    Carrie Mariee, PharmD, SILVESTRE  Clinic Specialty Pharmacist, Neurology  2/11/2025  09:10 EST

## 2025-03-02 ENCOUNTER — TELEPHONE (OUTPATIENT)
Dept: NEUROLOGY | Facility: CLINIC | Age: 50
End: 2025-03-02
Payer: COMMERCIAL

## 2025-03-04 RX ORDER — INDOMETHACIN 50 MG/1
CAPSULE ORAL
Qty: 7 CAPSULE | Refills: 0 | Status: SHIPPED | OUTPATIENT
Start: 2025-03-04

## 2025-03-31 ENCOUNTER — TRANSCRIBE ORDERS (OUTPATIENT)
Dept: ADMINISTRATIVE | Facility: HOSPITAL | Age: 50
End: 2025-03-31
Payer: COMMERCIAL

## 2025-03-31 DIAGNOSIS — Z12.31 VISIT FOR SCREENING MAMMOGRAM: Primary | ICD-10-CM

## 2025-04-15 ENCOUNTER — SPECIALTY PHARMACY (OUTPATIENT)
Dept: GENERAL RADIOLOGY | Facility: HOSPITAL | Age: 50
End: 2025-04-15
Payer: COMMERCIAL

## 2025-04-15 RX ORDER — FREMANEZUMAB-VFRM 225 MG/1.5ML
225 INJECTION SUBCUTANEOUS
Qty: 1.5 ML | Refills: 11 | Status: SHIPPED | OUTPATIENT
Start: 2025-04-15

## 2025-05-04 LAB
NCCN CRITERIA FLAG: NORMAL
TYRER CUZICK SCORE: 11.9

## 2025-05-05 ENCOUNTER — TELEPHONE (OUTPATIENT)
Dept: NEUROLOGY | Facility: CLINIC | Age: 50
End: 2025-05-05
Payer: COMMERCIAL

## 2025-05-05 RX ORDER — RIZATRIPTAN BENZOATE 10 MG/1
10 TABLET, ORALLY DISINTEGRATING ORAL ONCE AS NEEDED
Qty: 12 TABLET | Refills: 3 | Status: SHIPPED | OUTPATIENT
Start: 2025-05-05

## 2025-05-05 NOTE — TELEPHONE ENCOUNTER
Caller: Moira Cardoso    Relationship: Self    Best call back number: [phone]    Preferred pharmacy: [facility] [id number] - [place]  - [phone]  - [phone]     What was the call regarding: PT STATES [facility] HAS ADVISED HER THAT THE RIZATRIPTAN TABLET IS BACK-ORDERED WITH THE  AND WILL LIKELY NOT COME IN UNTIL LATE-MAY 2025.    PT ASKS IF DR. LOVE COULD SEND ALTERNATIVE RIZATRIPTAN FORM: DISSOLVABLE OR LIQUID. PT HAS JUST 1 TABLET OF THE RIZATRIPTAN LEFT BUT FEELS SHE HAS BEEN TAKING IT MORE FREQUENTLY BECAUSE SHE HAS HAD MORE FREQUENT MIGRAINES DUE TO NOT HAVING BEEN COMPLETING HER MONTHLY AIMOVIG INJECTIONS AS SHE HAS NOT YET MET HER ANNUAL DEDUCTIBLE.    Do you require a callback: YES, PLEASE.    PLEASE REVIEW AND ADVISE.

## 2025-05-09 ENCOUNTER — HOSPITAL ENCOUNTER (OUTPATIENT)
Dept: MAMMOGRAPHY | Facility: HOSPITAL | Age: 50
Discharge: HOME | End: 2025-05-09
Admitting: OBSTETRICS & GYNECOLOGY
Payer: COMMERCIAL

## 2025-05-09 DIAGNOSIS — Z12.31 VISIT FOR SCREENING MAMMOGRAM: ICD-10-CM

## 2025-05-09 PROCEDURE — 77067 SCR MAMMO BI INCL CAD: CPT

## 2025-05-09 PROCEDURE — 77063 BREAST TOMOSYNTHESIS BI: CPT

## 2025-05-19 ENCOUNTER — SPECIALTY PHARMACY (OUTPATIENT)
Dept: NEUROLOGY | Facility: CLINIC | Age: 50
End: 2025-05-19
Payer: COMMERCIAL

## 2025-05-23 ENCOUNTER — SPECIALTY PHARMACY (OUTPATIENT)
Dept: GENERAL RADIOLOGY | Facility: HOSPITAL | Age: 50
End: 2025-05-23
Payer: COMMERCIAL

## 2025-05-23 RX ORDER — GALCANEZUMAB 120 MG/ML
120 INJECTION, SOLUTION SUBCUTANEOUS
Qty: 1 ML | Refills: 6 | Status: SHIPPED | OUTPATIENT
Start: 2025-05-23

## 2025-05-23 NOTE — PROGRESS NOTES
Specialty Pharmacy Patient Management Program  Neurology Initial Assessment     Moira Cardoso is a 49 y.o. female with migraines seen by a Neurology provider and enrolled in the Neurology Patient Management program offered by [facility].  An initial outreach was conducted, including assessment of therapy appropriateness and specialty medication education for Emgality. The patient was introduced to services offered by [facility], including: regular assessments, refill coordination, curbside pick-up or mail order delivery options, prior authorization maintenance, and financial assistance programs as applicable. The patient was also provided with contact information for the pharmacy team.     Insurance Coverage & Financial Support  [employer]  Emgality Copay Card    Relevant Past Medical History and Comorbidities  Relevant medical history and concomitant health conditions were discussed with the patient. The patient's chart has been reviewed for relevant past medical history and comorbid health conditions and updated as necessary.   Past Medical History:   Diagnosis Date    Abnormal Pap smear of cervix     30+ years ago    Class 2 obesity due to excess calories without serious comorbidity with body mass index (BMI) of 38.0 to 38.9 in adult 01/20/2020    Endometriosis     diagnosed during c/s    Frozen shoulder     Headache, tension-type     Migraine      Social History     Socioeconomic History    Marital status:    Tobacco Use    Smoking status: Never     Passive exposure: Never    Smokeless tobacco: Never   Vaping Use    Vaping status: Never Used   Substance and Sexual Activity    Alcohol use: Yes     Alcohol/week: 1.0 standard drink of alcohol     Types: 1 Glasses of wine per week     Comment: occasionally    Drug use: No    Sexual activity: Yes     Partners: Male     Birth control/protection: Post-menopausal     Problem list reviewed by Carrie Mariee,  MARQUEZ on 5/23/2025 at  8:28 AM    Allergies  Known allergies and reactions were discussed with the patient. The patient's chart has been reviewed for  allergy information and updated as necessary.   Allergies   Allergen Reactions    Penicillins Hives     Allergies reviewed by Carrie Mariee RP on 5/23/2025 at  8:28 AM    Relevant Laboratory Values  Common labs          9/11/2024    08:38 12/2/2024    11:06   Common Labs   Glucose 112  124    BUN 15  17    Creatinine 0.76  0.81    Sodium 142  143    Potassium 4.4  5.5    Chloride 106  103    Calcium 9.7  10.4    Albumin 4.2  4.6    Total Bilirubin 0.3  0.3    Alkaline Phosphatase 82  101    AST (SGOT) 24  41    ALT (SGPT) 39  65    WBC 6.02     Hemoglobin 13.4     Hematocrit 39.6     Platelets 274     Total Cholesterol 177     Triglycerides 82     HDL Cholesterol 56     LDL Cholesterol  106     Hemoglobin A1C 5.90         Current Medication List  This medication list has been reviewed with the patient and evaluated for any interactions or necessary modifications/recommendations, and updated to include all prescription medications, OTC medications, and supplements the patient is currently taking.  This list reflects what is contained in the patient's profile, which has also been marked as reviewed to communicate to other providers it is the most up to date version of the patient's current medication therapy.     Current Outpatient Medications:     Cholecalciferol (Vitamin D3) 50 MCG (2000 UT) tablet, Take 1 tablet by mouth Daily. 2000 iu daily, Disp: , Rfl:     esomeprazole (nexIUM) 20 MG capsule, Take 1 capsule by mouth Every Morning Before Breakfast., Disp: , Rfl:     Fremanezumab-vfrm (Ajovy) 225 MG/1.5ML solution auto-injector, Inject 225 mg under the skin into the appropriate area as directed Every 30 (Thirty) Days., Disp: 1.5 mL, Rfl: 11    galcanezumab-gnlm (Emgality) 120 MG/ML auto-injector pen, Inject 1 mL under the skin into the appropriate area as  directed Every 30 (Thirty) Days., Disp: 1 mL, Rfl: 6    indomethacin (INDOCIN) 50 MG capsule, Take 1 capsule by mouth once a day for 1 week then stop. Pt tapering off medicine per neurologist., Disp: 7 capsule, Rfl: 0    Omega-3 Fatty Acids (fish oil) 1000 MG capsule capsule, Take  by mouth Daily With Breakfast., Disp: , Rfl:     rizatriptan (MAXALT) 10 MG tablet, Take 1 tablet by mouth 1 (One) Time As Needed for Migraine for up to 1 dose. May repeat in 2 hours if needed, Disp: 12 tablet, Rfl: 6    rizatriptan MLT (MAXALT-MLT) 10 MG disintegrating tablet, Place 1 tablet on the tongue 1 (One) Time As Needed for Migraine for up to 30 doses. May repeat in 2 hours if needed, Disp: 12 tablet, Rfl: 3    Medicines reviewed by Carrie Mariee [facility] on 5/23/2025 at  8:28 AM    Drug Interactions  None     Initial Education Provided for Specialty Medication  The patient has been provided with the following education and any applicable administration techniques (i.e. self-injection) have been demonstrated for the therapies indicated. All questions and concerns have been addressed prior to the patient receiving the medication, and the patient has verbalized understanding of the education and any materials provided.  Additional patient education shall be provided and documented upon request by the patient, provider or payer.              Emgality (Galcanezumab-[facility])        Medication Expectations   Why am I taking this medication? You are taking this medication for migraine prophylaxis.   What should I expect while on this medication? You should expect to a decrease in the frequency and severity of your migraines.   How does the medication work? Emgality is a monoclonal antibody that binds to calcitonin gene-related peptide (CGRP) and blocks its binding to the receptor decreasing the severity of migraines.   How long will I be on this medication for? The amount of time you will be on this medication will be determined by your  doctor and your response to the medication.    How do I take this medication? Take as directed on your prescription label. This medication is a self-injection given every 28 days.    What are some possible side effects? Injection site reactions and hypersensitivity reactions.   What happens if I miss a dose? If you miss a dose, take it as soon as you remember, and time next dose 28 days from last dose.                  Medication Safety   What are things I should warn my doctor immediately about? Hypersensitivity reactions.   What are things that I should be cautious of? Injection site reaction.   What are some medications that can interact with this one? No drug interactions identified.            Medication Storage/Handling   How should I handle this medication? Keep this medication our of reach of pets/children in original container.  On the day your Emgality is due let it set at room temperature for 30 minutes prior to injection. (do NOT warm using a heat source such as hot water or a microwave).  Administer in the abdomen, thigh, back of the upper arm, or buttocks.  Do not inject where the skin is tender, bruised, red or hard.  Rotate injection sites.   How does this medication need to be stored? Store in refrigerator and keep dry.   How should I dispose of this medication? You can dispose of the empty syringe in a sharps container, and if this is not available you may use an empty hard plastic container such as a milk jug or tide container.            Resources/Support   How can I remind myself to take this medication? You can download a reminder zach on your phone or use a calandar  to help with your monthly injection.   Is financial support available?  Yes, [employer] can provide co-pay cards if you have commercial insurance or patient assistance if you have Medicare or no insurance.    Which vaccines are recommended for me? Talk to your doctor about these vaccines: Flu, Coronavirus (COVID-19), Pneumococcal  (pneumonia), Tdap, Hepatitis B, Zoster (shingles)                Adherence and Self-Administration  Adherence related to the patient's specialty therapy was discussed with the patient. The Adherence segment of this outreach has been reviewed and updated.   Is there a concern with patient's ability to self administer the medication correctly and without issue?: No  Were any potential barriers to adherence identified during the initial assessment or patient education?: No  Are there any concerns regarding the patient's understanding of the importance of medication adherence?: No  Methods for Supporting Patient Adherence and/or Self-Administration: None    Goals of Therapy  Goals related to the patient's specialty therapy were discussed with the patient. The Patient Goals segment of this outreach has been reviewed and updated.   Goals Addressed Today        Specialty Pharmacy General Goal      On Average, Reduce:   Frequency of migraines to 4 per month.   Symptom severity by 50% within 1 hours of taking acute therapy.   Duration of migraines to 2 hours.     Baseline Values/Notes on Enrollment  Frequency: 8 MMD  Symptom Severity: photophobia, phonophobia  Duration: Several hours    Date of Reassessment Notes on Progress Toward Above Goals   5/23/25 Switching to Emgality due to INS preference. Patient currently reports 4 or 5 MMD. Patient reports she had been not trying to take anything but migraines have started to come back.                                                            Reassessment Plan & Follow-Up  Medication Therapy Changes: Discontinue Ajovy 225 mg subcutaneously monthly and initiate Emgality 120 mg subcutaneously monthly.   Related Plans, Therapy Recommendations, or Therapy Problems to Be Addressed: None  Pharmacist to perform regular reassessments no more than (6) months from the previous assessment.  Care Coordinator to set up future refill outreaches, coordinate prescription delivery, and escalate  clinical questions to pharmacist.   Welcome information and patient satisfaction survey to be sent by specialty pharmacy team with patient's initial fill.    Attestation  Therapeutic appropriateness: Appropriate   I attest the patient was actively involved in and has agreed to the above plan of care. If the prescribed therapy is at any point deemed not appropriate based on the current or future assessments, a consultation will be initiated with the patient's specialty care provider to determine the best course of action. The revised plan of therapy will be documented along with any additional patient education provided. Discussed aforementioned material with patient via telemedicine.    Carrie Mariee, PharmD, SILVESTRE  [facility] Specialty Pharmacist, Neurology  5/23/2025  08:29 EDT

## 2025-06-18 ENCOUNTER — SPECIALTY PHARMACY (OUTPATIENT)
Dept: GENERAL RADIOLOGY | Facility: HOSPITAL | Age: 50
End: 2025-06-18
Payer: COMMERCIAL

## 2025-06-18 ENCOUNTER — SPECIALTY PHARMACY (OUTPATIENT)
Dept: NEUROLOGY | Facility: CLINIC | Age: 50
End: 2025-06-18
Payer: COMMERCIAL

## 2025-06-18 RX ORDER — GALCANEZUMAB 120 MG/ML
120 INJECTION, SOLUTION SUBCUTANEOUS
Qty: 1 ML | Refills: 6 | Status: SHIPPED | OUTPATIENT
Start: 2025-06-18

## 2025-07-24 ENCOUNTER — OFFICE VISIT (OUTPATIENT)
Dept: INTERNAL MEDICINE | Facility: CLINIC | Age: 50
End: 2025-07-24
Payer: COMMERCIAL

## 2025-07-24 VITALS
BODY MASS INDEX: 41.12 KG/M2 | DIASTOLIC BLOOD PRESSURE: 76 MMHG | TEMPERATURE: 98 F | SYSTOLIC BLOOD PRESSURE: 112 MMHG | OXYGEN SATURATION: 97 % | HEIGHT: 65 IN | WEIGHT: 246.8 LBS | HEART RATE: 78 BPM

## 2025-07-24 DIAGNOSIS — M79.662 BILATERAL CALF PAIN: ICD-10-CM

## 2025-07-24 DIAGNOSIS — R68.84 JAW PAIN: ICD-10-CM

## 2025-07-24 DIAGNOSIS — R76.8 THYROID ANTIBODY POSITIVE: ICD-10-CM

## 2025-07-24 DIAGNOSIS — Z78.9 NO SIGNIFICANT FAMILY HISTORY: ICD-10-CM

## 2025-07-24 DIAGNOSIS — R73.09 ELEVATED HEMOGLOBIN A1C: ICD-10-CM

## 2025-07-24 DIAGNOSIS — Z13.29 SCREENING FOR THYROID DISORDER: ICD-10-CM

## 2025-07-24 DIAGNOSIS — Z28.21 IMMUNIZATION DECLINED: ICD-10-CM

## 2025-07-24 DIAGNOSIS — Z13.220 SCREENING FOR LIPID DISORDERS: ICD-10-CM

## 2025-07-24 DIAGNOSIS — M79.661 BILATERAL CALF PAIN: ICD-10-CM

## 2025-07-24 DIAGNOSIS — Z13.31 DEPRESSION SCREEN: ICD-10-CM

## 2025-07-24 DIAGNOSIS — Z00.00 ENCOUNTER FOR WELL ADULT EXAM WITHOUT ABNORMAL FINDINGS: ICD-10-CM

## 2025-07-24 DIAGNOSIS — Z00.00 ANNUAL PHYSICAL EXAM: Primary | ICD-10-CM

## 2025-07-24 DIAGNOSIS — Z97.3 WEARS GLASSES: ICD-10-CM

## 2025-07-24 DIAGNOSIS — G43.019 INTRACTABLE MIGRAINE WITHOUT AURA AND WITHOUT STATUS MIGRAINOSUS: ICD-10-CM

## 2025-07-24 DIAGNOSIS — Z13.1 SCREENING FOR DIABETES MELLITUS: ICD-10-CM

## 2025-07-24 DIAGNOSIS — E66.813 OBESITY, CLASS III, BMI 40-49.9 (MORBID OBESITY): ICD-10-CM

## 2025-07-24 DIAGNOSIS — Z78.9 NON-SMOKER: ICD-10-CM

## 2025-07-24 PROCEDURE — 96127 BRIEF EMOTIONAL/BEHAV ASSMT: CPT | Performed by: NURSE PRACTITIONER

## 2025-07-24 PROCEDURE — 99396 PREV VISIT EST AGE 40-64: CPT | Performed by: NURSE PRACTITIONER

## 2025-07-24 NOTE — PROGRESS NOTES
Annual Physical     Name: Moira Cardoso    : 1975     MRN: [id number]     Chief Complaint  Annual Exam    Subjective     History of Present Illness:  Moira Cardoso is a 50 y.o. female who presents today for annual physical exam    Patient currently follows with neurology regarding migraines and jaw pain.  Patient did have MRI completed.    Patient did follow with endocrinology regarding thyroid antibody positive    Patient did follow with weight management    Patient has followed with OB/GYN, Dr. Adams    Patient is a non-smoker.  Occasional alcohol use.  No drug use.    Denies any significant family history    Patient did have some noted bilateral calf pain she wanted to make aware today.  She denies any chest pain or palpitations or shortness of breath.  It is bilateral versus unilateral.  No redness or swelling.  Patient denies staying well-hydrated      The patient is being seen for a health maintenance evaluation.    Past Medical History:   Diagnosis Date    Abnormal Pap smear of cervix     30+ years ago    Class 2 obesity due to excess calories without serious comorbidity with body mass index (BMI) of 38.0 to 38.9 in adult 2020    Endometriosis     diagnosed during c/s    Frozen shoulder     Headache, tension-type     Migraine        Past Surgical History:   Procedure Laterality Date     SECTION      DENTAL PROCEDURE         Social History     Socioeconomic History    Marital status:    Tobacco Use    Smoking status: Never     Passive exposure: Never    Smokeless tobacco: Never   Vaping Use    Vaping status: Never Used   Substance and Sexual Activity    Alcohol use: Yes     Alcohol/week: 1.0 standard drink of alcohol     Types: 1 Glasses of wine per week     Comment: occasionally    Drug use: No    Sexual activity: Yes     Partners: Male     Birth control/protection: Post-menopausal         Current Outpatient Medications:     Cholecalciferol (Vitamin D3) 50 MCG (2000)  tablet, Take 1 tablet by mouth Daily. 2000 iu daily, Disp: , Rfl:     esomeprazole (nexIUM) 20 MG capsule, Take 1 capsule by mouth Every Morning Before Breakfast., Disp: , Rfl:     galcanezumab-gnlm (Emgality) 120 MG/ML auto-injector pen, Inject 1 mL under the skin into the appropriate area as directed Every 30 (Thirty) Days., Disp: 1 mL, Rfl: 6    Omega-3 Fatty Acids (fish oil) 1000 MG capsule capsule, Take  by mouth Daily With Breakfast., Disp: , Rfl:     rizatriptan (MAXALT) 10 MG tablet, Take 1 tablet by mouth 1 (One) Time As Needed for Migraine for up to 1 dose. May repeat in 2 hours if needed, Disp: 12 tablet, Rfl: 6    General History  Moira  does have regular dental visits.  She does complain of vision problems. Last eye exam was up to date.  Immunizations are not up to date. The patient needs the following immunizations: Recommended pneumonia vaccination    Lifestyle  Moira  consumes a mix between healthy and unhealthy.  She exercises intermittently.    Reproductive Health  Moira  is postmenopausal.  She reports periods are rare.  She is sexually active. Her contraceptive plan is no method.    Screening  Last pap was 2022 OB/GYN  Last Completed Pap Smear            Upcoming       PAP SMEAR (Every 3 Years) Next due on 8/3/2025      08/03/2022  LIQUID-BASED PAP SMEAR, [facility] ([facility])    07/08/2020  Done - negative- HPV non 16/18 negative                        . History of abnormal pap smear or family history of gyn cancer: None stated      Last mammogram was 2025  Last Completed Mammogram            Upcoming       MAMMOGRAM (Every 2 Years) Next due on 5/9/2027 05/09/2025  Mammo Screening Digital Tomosynthesis Bilateral With CAD    05/02/2024  Mammo Screening Digital Tomosynthesis Bilateral With CAD    04/29/2023  Mammo Screening Digital Tomosynthesis Bilateral With CAD    04/20/2022  Mammo Screening Digital Tomosynthesis Bilateral With CAD    04/19/2021  Mammo Screening Digital  Tomosynthesis Bilateral With CAD     Only the first 5 history entries have been loaded, but more history exists.                      . Personal or family history of abnormal mammograms or breast cancer: None stated    Last colonoscopy was 2021 with recommended 10-year repeat  Last Completed Colonoscopy            Upcoming       COLORECTAL CANCER SCREENING (COLONOSCOPY - Preferred) Next due on 9/15/2031      09/15/2021  SCANNED - COLONOSCOPY                        . Family history of colon cancer: None stated    Last DEXA was never.    Advance Care Planning   ACP discussion was held with the patient during this visit. Patient has an advance directive (not in EMR), copy requested.       Health Maintenance Summary            Upcoming       Pneumococcal Vaccine 50+ (1 of 1 - PCV) Postponed until 1/20/2026 07/24/2025  Postponed until 1/20/2026 by Pretty Willams APRN (Patient Refused)              PAP SMEAR (Every 3 Years) Next due on 8/3/2025      08/03/2022  LIQUID-BASED PAP SMEAR, [facility] ([facility])    07/08/2020  Done - negative- HPV non 16/18 negative              INFLUENZA VACCINE (Yearly - October to March) Next due on 10/1/2025      12/06/2024  IMAGING SCANNED    12/06/2024  Imm Admin: Fluzone  >6mos    10/26/2023  Imm Admin: Flublok 18+yrs    10/27/2021  Imm Admin: Flublok 18+yrs    10/30/2020  Imm Admin: Flublock Quad =>18yrs      Only the first 5 history entries have been loaded, but more history exists.              ANNUAL PHYSICAL (Yearly) Next due on 7/24/2026 07/24/2025  Done    07/23/2024  Done              MAMMOGRAM (Every 2 Years) Next due on 5/9/2027 05/09/2025  Mammo Screening Digital Tomosynthesis Bilateral With CAD    05/02/2024  Mammo Screening Digital Tomosynthesis Bilateral With CAD    04/29/2023  Mammo Screening Digital Tomosynthesis Bilateral With CAD    04/20/2022  Mammo Screening Digital Tomosynthesis Bilateral With CAD    04/19/2021  Mammo Screening Digital  "Tomosynthesis Bilateral With CAD      Only the first 5 history entries have been loaded, but more history exists.              COLORECTAL CANCER SCREENING (COLONOSCOPY - Preferred) Next due on 9/15/2031      07/24/2025  Follow-up changed to COLONOSCOPY - Every 10 Years by Pretty Willams APRN (Follow-up Confirmed Manually)    09/15/2021  SCANNED - COLONOSCOPY              TDAP/TD VACCINES (2 - Td or Tdap) Next due on 1/12/2034 01/12/2024  Imm Admin: Tdap                      Completed or No Longer Recommended       HEPATITIS C SCREENING  Completed      08/10/2023  Hep C Virus Ab component of Hepatitis C Antibody              ZOSTER VACCINE (Series Information) Completed      04/19/2024  Imm Admin: Shingrix    11/17/2023  Imm Admin: Shingrix              COVID-19 Vaccine  Discontinued      01/12/2024  Frequency changed to Never by Pretty Willams APRN (declined)    02/12/2021  Imm Admin: COVID-19 (PFIZER) Purple Cap Monovalent    01/21/2021  Imm Admin: COVID-19 (PFIZER) Purple Cap Monovalent                          Immunization History   Administered Date(s) Administered    COVID-19 (PFIZER) Purple Cap Monovalent 01/21/2021, 02/12/2021    Flublock Quad =>18yrs 10/30/2020    Flublok 18+yrs 10/27/2021, 10/26/2023    Fluzone  >6mos 12/06/2024    Fluzone (or Fluarix & Flulaval for VFC) >6mos 10/16/2019    Influenza Injectable Mdck Pf Quad 10/02/2018    Shingrix 11/17/2023, 04/19/2024    Tdap 01/12/2024       Review of Systems    Objective     Vital Signs  /76 (BP Location: Left arm, Patient Position: Sitting, Cuff Size: Adult)   Pulse 78   Temp 98 °F (36.7 °C)   Ht 163.8 cm (64.5\")   Wt 112 kg (246 lb 12.8 oz)   SpO2 97%   BMI 41.71 kg/m²   Estimated body mass index is 41.71 kg/m² as calculated from the following:    Height as of this encounter: 163.8 cm (64.5\").    Weight as of this encounter: 112 kg (246 lb 12.8 oz).            PHQ-9 Depression Screening  Little interest or pleasure in doing " things? Not at all   Feeling down, depressed, or hopeless? Not at all   PHQ-2 Total Score 0   Trouble falling or staying asleep, or sleeping too much?     Feeling tired or having little energy?     Poor appetite or overeating?     Feeling bad about yourself - or that you are a failure or have let yourself or your family down?     Trouble concentrating on things, such as reading the newspaper or watching television?     Moving or speaking so slowly that other people could have noticed? Or the opposite - being so fidgety or restless that you have been moving around a lot more than usual?     Thoughts that you would be better off dead, or of hurting yourself in some way?     PHQ-9 Total Score     If you checked off any problems, how difficult have these problems made it for you to do your work, take care of things at home, or get along with other people? Not difficult at all     PHQ-9 Total Score:           Physical Exam  Vitals and nursing note reviewed.   Constitutional:       Appearance: Normal appearance. She is obese.   HENT:      Head: Normocephalic and atraumatic.   Eyes:      Extraocular Movements: Extraocular movements intact.      Pupils: Pupils are equal, round, and reactive to light.      Comments: Glasses in place   Cardiovascular:      Rate and Rhythm: Normal rate and regular rhythm.      Pulses: Normal pulses.           Radial pulses are 2+ on the right side and 2+ on the left side.        Posterior tibial pulses are 2+ on the right side and 2+ on the left side.      Heart sounds: Normal heart sounds, S1 normal and S2 normal.   Pulmonary:      Effort: Pulmonary effort is normal.      Breath sounds: Normal breath sounds.   Abdominal:      General: Bowel sounds are normal.      Palpations: Abdomen is soft.   Musculoskeletal:         General: Normal range of motion.   Skin:     General: Skin is warm and dry.   Neurological:      Mental Status: She is alert and oriented to person, place, and time.       Comments: Mental status fully intact as patient was able to provide a detailed description of the events   Psychiatric:         Mood and Affect: Mood normal.         Behavior: Behavior normal.         Thought Content: Thought content normal.         Judgment: Judgment normal.                 Assessment and Plan     Diagnoses and all orders for this visit:    1. Annual physical exam (Primary)  -     CBC (No Diff); Future  -     Comprehensive Metabolic Panel; Future  -     Lipid Panel; Future  -     TSH Rfx On Abnormal To Free T4; Future  -     Hemoglobin A1c; Future    2. Encounter for well adult exam without abnormal findings    3. Intractable migraine without aura and without status migrainosus  -     CBC (No Diff); Future  -     Comprehensive Metabolic Panel; Future    4. Jaw pain    5. Thyroid antibody positive    6. Non-smoker    7. No significant family history    8. Obesity, Class III, BMI 40-49.9 (morbid obesity)    9. Wears glasses    10. Screening for lipid disorders  -     Lipid Panel; Future    11. Screening for diabetes mellitus  -     Hemoglobin A1c; Future    12. Screening for thyroid disorder  -     TSH Rfx On Abnormal To Free T4; Future    13. Elevated hemoglobin A1c    14. Bilateral calf pain    15. Immunization declined    16. Depression screen    Plan  Annual physical exam completed with patient today    Labs are ordered and patient will return fasting to have these completed    Denied any particular refills at this time    Continue with non-smoking status    Go to ER if any condition worsens or severe    Pneumonia vaccine declined today but patient may consider at a later date    Depression screening negative results    Regarding her bilateral calf pain, I did encourage patient to stay well-hydrated especially with use of water and electrolytes.  Continue exercise regimen    Plan to follow-up 1 year for annual physical    Follow Up  Return for Annual physical.    PANCHO Mack    Part  of this note may be an electronic transcription/translation of spoken language to printed text using the Dragon Dictation System.